# Patient Record
Sex: FEMALE | Employment: UNEMPLOYED | URBAN - METROPOLITAN AREA
[De-identification: names, ages, dates, MRNs, and addresses within clinical notes are randomized per-mention and may not be internally consistent; named-entity substitution may affect disease eponyms.]

---

## 2018-04-02 RX ORDER — ESCITALOPRAM OXALATE 20 MG/1
20 TABLET ORAL DAILY
COMMUNITY
Start: 2018-02-08

## 2018-04-02 RX ORDER — METOPROLOL SUCCINATE 50 MG/1
50 TABLET, EXTENDED RELEASE ORAL DAILY
COMMUNITY
Start: 2018-02-08

## 2018-04-03 ENCOUNTER — ANESTHESIA EVENT (OUTPATIENT)
Dept: SURGERY | Age: 58
DRG: 460 | End: 2018-04-03
Payer: COMMERCIAL

## 2018-04-04 ENCOUNTER — ANESTHESIA (OUTPATIENT)
Dept: SURGERY | Age: 58
DRG: 460 | End: 2018-04-04
Payer: COMMERCIAL

## 2018-04-04 ENCOUNTER — APPOINTMENT (OUTPATIENT)
Dept: GENERAL RADIOLOGY | Age: 58
DRG: 460 | End: 2018-04-04
Attending: ORTHOPAEDIC SURGERY
Payer: COMMERCIAL

## 2018-04-04 ENCOUNTER — HOSPITAL ENCOUNTER (INPATIENT)
Age: 58
LOS: 3 days | Discharge: HOME HEALTH | DRG: 460 | End: 2018-04-07
Attending: ORTHOPAEDIC SURGERY | Admitting: ORTHOPAEDIC SURGERY
Payer: COMMERCIAL

## 2018-04-04 PROBLEM — M48.061 LUMBAR STENOSIS: Status: ACTIVE | Noted: 2018-04-04

## 2018-04-04 LAB
ABO + RH BLD: NORMAL
BLOOD GROUP ANTIBODIES SERPL: NORMAL
SPECIMEN EXP DATE BLD: NORMAL

## 2018-04-04 PROCEDURE — 0SG1071 FUSION OF 2 OR MORE LUMBAR VERTEBRAL JOINTS WITH AUTOLOGOUS TISSUE SUBSTITUTE, POSTERIOR APPROACH, POSTERIOR COLUMN, OPEN APPROACH: ICD-10-PCS | Performed by: ORTHOPAEDIC SURGERY

## 2018-04-04 PROCEDURE — 77030004391 HC BUR FLUT MEDT -C: Performed by: ORTHOPAEDIC SURGERY

## 2018-04-04 PROCEDURE — 77030034850: Performed by: ORTHOPAEDIC SURGERY

## 2018-04-04 PROCEDURE — 72100 X-RAY EXAM L-S SPINE 2/3 VWS: CPT

## 2018-04-04 PROCEDURE — 74011250636 HC RX REV CODE- 250/636: Performed by: ORTHOPAEDIC SURGERY

## 2018-04-04 PROCEDURE — 76060000039 HC ANESTHESIA 4 TO 4.5 HR: Performed by: ORTHOPAEDIC SURGERY

## 2018-04-04 PROCEDURE — 0SG3071 FUSION OF LUMBOSACRAL JOINT WITH AUTOLOGOUS TISSUE SUBSTITUTE, POSTERIOR APPROACH, POSTERIOR COLUMN, OPEN APPROACH: ICD-10-PCS | Performed by: ORTHOPAEDIC SURGERY

## 2018-04-04 PROCEDURE — 74011000250 HC RX REV CODE- 250: Performed by: ORTHOPAEDIC SURGERY

## 2018-04-04 PROCEDURE — 77030027138 HC INCENT SPIROMETER -A

## 2018-04-04 PROCEDURE — 77030014647 HC SEAL FBRN TISSL BAXT -D: Performed by: ORTHOPAEDIC SURGERY

## 2018-04-04 PROCEDURE — 74011250636 HC RX REV CODE- 250/636: Performed by: PHYSICIAN ASSISTANT

## 2018-04-04 PROCEDURE — 77030013079 HC BLNKT BAIR HGGR 3M -A: Performed by: NURSE ANESTHETIST, CERTIFIED REGISTERED

## 2018-04-04 PROCEDURE — 77030030107 HC BLD BN MILL DISP STRY -C: Performed by: ORTHOPAEDIC SURGERY

## 2018-04-04 PROCEDURE — 86901 BLOOD TYPING SEROLOGIC RH(D): CPT

## 2018-04-04 PROCEDURE — 77030008683 HC TU ET CUF COVD -A: Performed by: ANESTHESIOLOGY

## 2018-04-04 PROCEDURE — C1713 ANCHOR/SCREW BN/BN,TIS/BN: HCPCS | Performed by: ORTHOPAEDIC SURGERY

## 2018-04-04 PROCEDURE — 74011250636 HC RX REV CODE- 250/636: Performed by: NURSE ANESTHETIST, CERTIFIED REGISTERED

## 2018-04-04 PROCEDURE — 74011250637 HC RX REV CODE- 250/637: Performed by: ORTHOPAEDIC SURGERY

## 2018-04-04 PROCEDURE — 76210000017 HC OR PH I REC 1.5 TO 2 HR: Performed by: ORTHOPAEDIC SURGERY

## 2018-04-04 PROCEDURE — 77030003028 HC SUT VCRL J&J -A: Performed by: ORTHOPAEDIC SURGERY

## 2018-04-04 PROCEDURE — 74011000258 HC RX REV CODE- 258

## 2018-04-04 PROCEDURE — 77030008477 HC STYL SATN SLP COVD -A: Performed by: ANESTHESIOLOGY

## 2018-04-04 PROCEDURE — 77030018673: Performed by: ORTHOPAEDIC SURGERY

## 2018-04-04 PROCEDURE — 77030037735: Performed by: ORTHOPAEDIC SURGERY

## 2018-04-04 PROCEDURE — 74011000250 HC RX REV CODE- 250

## 2018-04-04 PROCEDURE — 77030032490 HC SLV COMPR SCD KNE COVD -B: Performed by: ORTHOPAEDIC SURGERY

## 2018-04-04 PROCEDURE — 77030011640 HC PAD GRND REM COVD -A: Performed by: ORTHOPAEDIC SURGERY

## 2018-04-04 PROCEDURE — 74011250636 HC RX REV CODE- 250/636

## 2018-04-04 PROCEDURE — 77030019908 HC STETH ESOPH SIMS -A: Performed by: NURSE ANESTHETIST, CERTIFIED REGISTERED

## 2018-04-04 PROCEDURE — 74011250637 HC RX REV CODE- 250/637: Performed by: NURSE ANESTHETIST, CERTIFIED REGISTERED

## 2018-04-04 PROCEDURE — 76010000175 HC OR TIME 4 TO 4.5 HR INTENSV-TIER 1: Performed by: ORTHOPAEDIC SURGERY

## 2018-04-04 PROCEDURE — C1762 CONN TISS, HUMAN(INC FASCIA): HCPCS | Performed by: ORTHOPAEDIC SURGERY

## 2018-04-04 PROCEDURE — 74011000272 HC RX REV CODE- 272: Performed by: ORTHOPAEDIC SURGERY

## 2018-04-04 PROCEDURE — 77030018836 HC SOL IRR NACL ICUM -A: Performed by: ORTHOPAEDIC SURGERY

## 2018-04-04 PROCEDURE — 77030018723 HC ELCTRD BLD COVD -A: Performed by: ORTHOPAEDIC SURGERY

## 2018-04-04 PROCEDURE — 77030031139 HC SUT VCRL2 J&J -A: Performed by: ORTHOPAEDIC SURGERY

## 2018-04-04 PROCEDURE — 65270000029 HC RM PRIVATE

## 2018-04-04 PROCEDURE — 77030020274 HC MISC IMPL ORTHOPEDIC: Performed by: ORTHOPAEDIC SURGERY

## 2018-04-04 DEVICE — IMPLANTABLE DEVICE: Type: IMPLANTABLE DEVICE | Site: BACK | Status: FUNCTIONAL

## 2018-04-04 RX ORDER — DEXTROSE, SODIUM CHLORIDE, AND POTASSIUM CHLORIDE 5; .45; .15 G/100ML; G/100ML; G/100ML
100 INJECTION INTRAVENOUS CONTINUOUS
Status: DISCONTINUED | OUTPATIENT
Start: 2018-04-04 | End: 2018-04-07

## 2018-04-04 RX ORDER — ACETAMINOPHEN 325 MG/1
650 TABLET ORAL
Status: DISCONTINUED | OUTPATIENT
Start: 2018-04-04 | End: 2018-04-07 | Stop reason: HOSPADM

## 2018-04-04 RX ORDER — MORPHINE SULFATE 4 MG/ML
4 INJECTION INTRAVENOUS
Status: DISCONTINUED | OUTPATIENT
Start: 2018-04-04 | End: 2018-04-04

## 2018-04-04 RX ORDER — FAMOTIDINE 20 MG/1
20 TABLET, FILM COATED ORAL ONCE
Status: COMPLETED | OUTPATIENT
Start: 2018-04-04 | End: 2018-04-04

## 2018-04-04 RX ORDER — FENTANYL CITRATE 50 UG/ML
50 INJECTION, SOLUTION INTRAMUSCULAR; INTRAVENOUS AS NEEDED
Status: DISCONTINUED | OUTPATIENT
Start: 2018-04-04 | End: 2018-04-04 | Stop reason: HOSPADM

## 2018-04-04 RX ORDER — DEXAMETHASONE SODIUM PHOSPHATE 4 MG/ML
INJECTION, SOLUTION INTRA-ARTICULAR; INTRALESIONAL; INTRAMUSCULAR; INTRAVENOUS; SOFT TISSUE AS NEEDED
Status: DISCONTINUED | OUTPATIENT
Start: 2018-04-04 | End: 2018-04-04 | Stop reason: HOSPADM

## 2018-04-04 RX ORDER — CEFAZOLIN SODIUM 2 G/50ML
2 SOLUTION INTRAVENOUS EVERY 8 HOURS
Status: COMPLETED | OUTPATIENT
Start: 2018-04-04 | End: 2018-04-05

## 2018-04-04 RX ORDER — VECURONIUM BROMIDE FOR INJECTION 1 MG/ML
INJECTION, POWDER, LYOPHILIZED, FOR SOLUTION INTRAVENOUS AS NEEDED
Status: DISCONTINUED | OUTPATIENT
Start: 2018-04-04 | End: 2018-04-04 | Stop reason: HOSPADM

## 2018-04-04 RX ORDER — ESMOLOL HYDROCHLORIDE 10 MG/ML
INJECTION INTRAVENOUS AS NEEDED
Status: DISCONTINUED | OUTPATIENT
Start: 2018-04-04 | End: 2018-04-04 | Stop reason: HOSPADM

## 2018-04-04 RX ORDER — SODIUM CHLORIDE 0.9 % (FLUSH) 0.9 %
5-10 SYRINGE (ML) INJECTION EVERY 8 HOURS
Status: DISCONTINUED | OUTPATIENT
Start: 2018-04-04 | End: 2018-04-07 | Stop reason: HOSPADM

## 2018-04-04 RX ORDER — ONDANSETRON 2 MG/ML
4 INJECTION INTRAMUSCULAR; INTRAVENOUS ONCE
Status: DISCONTINUED | OUTPATIENT
Start: 2018-04-04 | End: 2018-04-04 | Stop reason: HOSPADM

## 2018-04-04 RX ORDER — LIDOCAINE HYDROCHLORIDE 20 MG/ML
INJECTION, SOLUTION EPIDURAL; INFILTRATION; INTRACAUDAL; PERINEURAL AS NEEDED
Status: DISCONTINUED | OUTPATIENT
Start: 2018-04-04 | End: 2018-04-04 | Stop reason: HOSPADM

## 2018-04-04 RX ORDER — ONDANSETRON 2 MG/ML
4 INJECTION INTRAMUSCULAR; INTRAVENOUS
Status: DISCONTINUED | OUTPATIENT
Start: 2018-04-04 | End: 2018-04-07 | Stop reason: HOSPADM

## 2018-04-04 RX ORDER — SODIUM CHLORIDE, SODIUM LACTATE, POTASSIUM CHLORIDE, CALCIUM CHLORIDE 600; 310; 30; 20 MG/100ML; MG/100ML; MG/100ML; MG/100ML
50 INJECTION, SOLUTION INTRAVENOUS CONTINUOUS
Status: DISCONTINUED | OUTPATIENT
Start: 2018-04-04 | End: 2018-04-04 | Stop reason: HOSPADM

## 2018-04-04 RX ORDER — NEOSTIGMINE METHYLSULFATE 5 MG/5 ML
SYRINGE (ML) INTRAVENOUS AS NEEDED
Status: DISCONTINUED | OUTPATIENT
Start: 2018-04-04 | End: 2018-04-04 | Stop reason: HOSPADM

## 2018-04-04 RX ORDER — OXYCODONE AND ACETAMINOPHEN 10; 325 MG/1; MG/1
2 TABLET ORAL
Status: DISCONTINUED | OUTPATIENT
Start: 2018-04-04 | End: 2018-04-06

## 2018-04-04 RX ORDER — DIAZEPAM 5 MG/1
10 TABLET ORAL
Status: DISCONTINUED | OUTPATIENT
Start: 2018-04-04 | End: 2018-04-06

## 2018-04-04 RX ORDER — SODIUM CHLORIDE 0.9 % (FLUSH) 0.9 %
5-10 SYRINGE (ML) INJECTION AS NEEDED
Status: DISCONTINUED | OUTPATIENT
Start: 2018-04-04 | End: 2018-04-07 | Stop reason: HOSPADM

## 2018-04-04 RX ORDER — SUCCINYLCHOLINE CHLORIDE 20 MG/ML
INJECTION INTRAMUSCULAR; INTRAVENOUS AS NEEDED
Status: DISCONTINUED | OUTPATIENT
Start: 2018-04-04 | End: 2018-04-04 | Stop reason: HOSPADM

## 2018-04-04 RX ORDER — MORPHINE SULFATE 10 MG/ML
INJECTION, SOLUTION INTRAMUSCULAR; INTRAVENOUS AS NEEDED
Status: DISCONTINUED | OUTPATIENT
Start: 2018-04-04 | End: 2018-04-04 | Stop reason: HOSPADM

## 2018-04-04 RX ORDER — MIDAZOLAM HYDROCHLORIDE 1 MG/ML
INJECTION, SOLUTION INTRAMUSCULAR; INTRAVENOUS AS NEEDED
Status: DISCONTINUED | OUTPATIENT
Start: 2018-04-04 | End: 2018-04-04 | Stop reason: HOSPADM

## 2018-04-04 RX ORDER — GLYCOPYRROLATE 0.2 MG/ML
INJECTION INTRAMUSCULAR; INTRAVENOUS AS NEEDED
Status: DISCONTINUED | OUTPATIENT
Start: 2018-04-04 | End: 2018-04-04 | Stop reason: HOSPADM

## 2018-04-04 RX ORDER — MORPHINE SULFATE 4 MG/ML
4 INJECTION, SOLUTION INTRAMUSCULAR; INTRAVENOUS
Status: DISCONTINUED | OUTPATIENT
Start: 2018-04-04 | End: 2018-04-04 | Stop reason: HOSPADM

## 2018-04-04 RX ORDER — CEFAZOLIN SODIUM 2 G/50ML
2 SOLUTION INTRAVENOUS
Status: COMPLETED | OUTPATIENT
Start: 2018-04-04 | End: 2018-04-04

## 2018-04-04 RX ORDER — FENTANYL CITRATE 50 UG/ML
INJECTION, SOLUTION INTRAMUSCULAR; INTRAVENOUS AS NEEDED
Status: DISCONTINUED | OUTPATIENT
Start: 2018-04-04 | End: 2018-04-04 | Stop reason: HOSPADM

## 2018-04-04 RX ORDER — PROPOFOL 10 MG/ML
INJECTION, EMULSION INTRAVENOUS AS NEEDED
Status: DISCONTINUED | OUTPATIENT
Start: 2018-04-04 | End: 2018-04-04 | Stop reason: HOSPADM

## 2018-04-04 RX ORDER — SODIUM CHLORIDE, SODIUM LACTATE, POTASSIUM CHLORIDE, CALCIUM CHLORIDE 600; 310; 30; 20 MG/100ML; MG/100ML; MG/100ML; MG/100ML
25 INJECTION, SOLUTION INTRAVENOUS CONTINUOUS
Status: DISCONTINUED | OUTPATIENT
Start: 2018-04-04 | End: 2018-04-07

## 2018-04-04 RX ORDER — HYDROMORPHONE HYDROCHLORIDE 1 MG/ML
2 INJECTION, SOLUTION INTRAMUSCULAR; INTRAVENOUS; SUBCUTANEOUS
Status: DISCONTINUED | OUTPATIENT
Start: 2018-04-04 | End: 2018-04-04 | Stop reason: CLARIF

## 2018-04-04 RX ORDER — MORPHINE SULFATE 4 MG/ML
4-6 INJECTION INTRAVENOUS
Status: DISCONTINUED | OUTPATIENT
Start: 2018-04-04 | End: 2018-04-06

## 2018-04-04 RX ORDER — EPHEDRINE SULFATE 50 MG/ML
INJECTION, SOLUTION INTRAVENOUS AS NEEDED
Status: DISCONTINUED | OUTPATIENT
Start: 2018-04-04 | End: 2018-04-04 | Stop reason: HOSPADM

## 2018-04-04 RX ORDER — DEXAMETHASONE SODIUM PHOSPHATE 4 MG/ML
4 INJECTION, SOLUTION INTRA-ARTICULAR; INTRALESIONAL; INTRAMUSCULAR; INTRAVENOUS; SOFT TISSUE ONCE
Status: COMPLETED | OUTPATIENT
Start: 2018-04-04 | End: 2018-04-04

## 2018-04-04 RX ORDER — SODIUM CHLORIDE 9 MG/ML
INJECTION, SOLUTION INTRAVENOUS
Status: DISCONTINUED | OUTPATIENT
Start: 2018-04-04 | End: 2018-04-04 | Stop reason: HOSPADM

## 2018-04-04 RX ORDER — VANCOMYCIN HYDROCHLORIDE 1 G/20ML
INJECTION, POWDER, LYOPHILIZED, FOR SOLUTION INTRAVENOUS AS NEEDED
Status: DISCONTINUED | OUTPATIENT
Start: 2018-04-04 | End: 2018-04-04 | Stop reason: HOSPADM

## 2018-04-04 RX ORDER — ONDANSETRON 2 MG/ML
INJECTION INTRAMUSCULAR; INTRAVENOUS AS NEEDED
Status: DISCONTINUED | OUTPATIENT
Start: 2018-04-04 | End: 2018-04-04 | Stop reason: HOSPADM

## 2018-04-04 RX ADMIN — DEXAMETHASONE SODIUM PHOSPHATE 4 MG: 4 INJECTION, SOLUTION INTRAMUSCULAR; INTRAVENOUS at 20:41

## 2018-04-04 RX ADMIN — ESMOLOL HYDROCHLORIDE 5 MG: 10 INJECTION INTRAVENOUS at 08:46

## 2018-04-04 RX ADMIN — SODIUM CHLORIDE: 9 INJECTION, SOLUTION INTRAVENOUS at 07:55

## 2018-04-04 RX ADMIN — FENTANYL CITRATE 25 MCG: 50 INJECTION, SOLUTION INTRAMUSCULAR; INTRAVENOUS at 11:12

## 2018-04-04 RX ADMIN — FENTANYL CITRATE 50 MCG: 50 INJECTION, SOLUTION INTRAMUSCULAR; INTRAVENOUS at 12:53

## 2018-04-04 RX ADMIN — Medication 10 ML: at 15:38

## 2018-04-04 RX ADMIN — MORPHINE SULFATE 2 MG: 10 INJECTION, SOLUTION INTRAMUSCULAR; INTRAVENOUS at 11:14

## 2018-04-04 RX ADMIN — SODIUM CHLORIDE, SODIUM LACTATE, POTASSIUM CHLORIDE, AND CALCIUM CHLORIDE: 600; 310; 30; 20 INJECTION, SOLUTION INTRAVENOUS at 09:20

## 2018-04-04 RX ADMIN — FAMOTIDINE 20 MG: 20 TABLET ORAL at 07:01

## 2018-04-04 RX ADMIN — CEFAZOLIN SODIUM 2 G: 2 SOLUTION INTRAVENOUS at 22:04

## 2018-04-04 RX ADMIN — MORPHINE SULFATE 4 MG: 10 INJECTION, SOLUTION INTRAMUSCULAR; INTRAVENOUS at 08:10

## 2018-04-04 RX ADMIN — LIDOCAINE HYDROCHLORIDE 50 MG: 20 INJECTION, SOLUTION EPIDURAL; INFILTRATION; INTRACAUDAL; PERINEURAL at 07:30

## 2018-04-04 RX ADMIN — VECURONIUM BROMIDE FOR INJECTION 1 MG: 1 INJECTION, POWDER, LYOPHILIZED, FOR SOLUTION INTRAVENOUS at 07:42

## 2018-04-04 RX ADMIN — FENTANYL CITRATE 50 MCG: 50 INJECTION, SOLUTION INTRAMUSCULAR; INTRAVENOUS at 12:21

## 2018-04-04 RX ADMIN — VECURONIUM BROMIDE FOR INJECTION 1 MG: 1 INJECTION, POWDER, LYOPHILIZED, FOR SOLUTION INTRAVENOUS at 09:18

## 2018-04-04 RX ADMIN — SUCCINYLCHOLINE CHLORIDE 100 MG: 20 INJECTION INTRAMUSCULAR; INTRAVENOUS at 07:30

## 2018-04-04 RX ADMIN — Medication 3 MG: at 11:13

## 2018-04-04 RX ADMIN — PROPOFOL 200 MG: 10 INJECTION, EMULSION INTRAVENOUS at 07:30

## 2018-04-04 RX ADMIN — VECURONIUM BROMIDE FOR INJECTION 5 MG: 1 INJECTION, POWDER, LYOPHILIZED, FOR SOLUTION INTRAVENOUS at 07:35

## 2018-04-04 RX ADMIN — EPHEDRINE SULFATE 5 MG: 50 INJECTION, SOLUTION INTRAVENOUS at 08:36

## 2018-04-04 RX ADMIN — OXYCODONE HYDROCHLORIDE AND ACETAMINOPHEN 2 TABLET: 10; 325 TABLET ORAL at 14:14

## 2018-04-04 RX ADMIN — MORPHINE SULFATE 6 MG: 4 INJECTION INTRAVENOUS at 20:42

## 2018-04-04 RX ADMIN — VECURONIUM BROMIDE FOR INJECTION 1 MG: 1 INJECTION, POWDER, LYOPHILIZED, FOR SOLUTION INTRAVENOUS at 10:07

## 2018-04-04 RX ADMIN — DIAZEPAM 10 MG: 5 TABLET ORAL at 16:49

## 2018-04-04 RX ADMIN — OXYCODONE HYDROCHLORIDE AND ACETAMINOPHEN 2 TABLET: 10; 325 TABLET ORAL at 23:45

## 2018-04-04 RX ADMIN — VECURONIUM BROMIDE FOR INJECTION 1 MG: 1 INJECTION, POWDER, LYOPHILIZED, FOR SOLUTION INTRAVENOUS at 09:41

## 2018-04-04 RX ADMIN — CEFAZOLIN SODIUM 2 G: 2 SOLUTION INTRAVENOUS at 14:15

## 2018-04-04 RX ADMIN — EPHEDRINE SULFATE 10 MG: 50 INJECTION, SOLUTION INTRAVENOUS at 07:45

## 2018-04-04 RX ADMIN — DEXTROSE MONOHYDRATE, SODIUM CHLORIDE, AND POTASSIUM CHLORIDE 100 ML/HR: 50; 4.5; 1.49 INJECTION, SOLUTION INTRAVENOUS at 13:59

## 2018-04-04 RX ADMIN — CEFAZOLIN SODIUM 2 G: 2 SOLUTION INTRAVENOUS at 07:40

## 2018-04-04 RX ADMIN — FENTANYL CITRATE 100 MCG: 50 INJECTION, SOLUTION INTRAMUSCULAR; INTRAVENOUS at 07:30

## 2018-04-04 RX ADMIN — VECURONIUM BROMIDE FOR INJECTION 1 MG: 1 INJECTION, POWDER, LYOPHILIZED, FOR SOLUTION INTRAVENOUS at 10:38

## 2018-04-04 RX ADMIN — FENTANYL CITRATE 25 MCG: 50 INJECTION, SOLUTION INTRAMUSCULAR; INTRAVENOUS at 11:30

## 2018-04-04 RX ADMIN — Medication 10 ML: at 23:46

## 2018-04-04 RX ADMIN — SODIUM CHLORIDE, SODIUM LACTATE, POTASSIUM CHLORIDE, AND CALCIUM CHLORIDE 50 ML/HR: 600; 310; 30; 20 INJECTION, SOLUTION INTRAVENOUS at 12:25

## 2018-04-04 RX ADMIN — MORPHINE SULFATE 4 MG: 4 INJECTION INTRAVENOUS at 17:53

## 2018-04-04 RX ADMIN — DEXAMETHASONE SODIUM PHOSPHATE 4 MG: 4 INJECTION, SOLUTION INTRA-ARTICULAR; INTRALESIONAL; INTRAMUSCULAR; INTRAVENOUS; SOFT TISSUE at 08:21

## 2018-04-04 RX ADMIN — FENTANYL CITRATE 50 MCG: 50 INJECTION, SOLUTION INTRAMUSCULAR; INTRAVENOUS at 08:26

## 2018-04-04 RX ADMIN — EPHEDRINE SULFATE 5 MG: 50 INJECTION, SOLUTION INTRAVENOUS at 11:21

## 2018-04-04 RX ADMIN — VECURONIUM BROMIDE FOR INJECTION 1 MG: 1 INJECTION, POWDER, LYOPHILIZED, FOR SOLUTION INTRAVENOUS at 08:52

## 2018-04-04 RX ADMIN — OXYCODONE HYDROCHLORIDE AND ACETAMINOPHEN 2 TABLET: 10; 325 TABLET ORAL at 19:28

## 2018-04-04 RX ADMIN — ONDANSETRON 4 MG: 2 INJECTION INTRAMUSCULAR; INTRAVENOUS at 10:54

## 2018-04-04 RX ADMIN — MIDAZOLAM HYDROCHLORIDE 2 MG: 1 INJECTION, SOLUTION INTRAMUSCULAR; INTRAVENOUS at 07:27

## 2018-04-04 RX ADMIN — GLYCOPYRROLATE 0.4 MG: 0.2 INJECTION INTRAMUSCULAR; INTRAVENOUS at 08:31

## 2018-04-04 RX ADMIN — GLYCOPYRROLATE 0.4 MG: 0.2 INJECTION INTRAMUSCULAR; INTRAVENOUS at 11:13

## 2018-04-04 RX ADMIN — SODIUM CHLORIDE, SODIUM LACTATE, POTASSIUM CHLORIDE, AND CALCIUM CHLORIDE 25 ML/HR: 600; 310; 30; 20 INJECTION, SOLUTION INTRAVENOUS at 07:01

## 2018-04-04 NOTE — OP NOTES
[facility]  OPERATIVE REPORT    Harris Mosher  MR#: [id number]  : 1960  ACCOUNT #: [de-identified]   DATE OF SERVICE: 2018    PREOPERATIVE DIAGNOSES:  Lumbar stenosis and spondylosis, L3 to S1, status post decompression, L4 to S1.    POSTOPERATIVE DIAGNOSES:  Lumbar stenosis and spondylosis, L3 to S1, status post decompression, L4 to S1.    PROCEDURES PERFORMED:  1. Lumbar decompression L3 to S1, bilateral exploration and decompression of the L3, L4, L5 and S1 nerve roots with foraminotomy and partial facetectomy. 2.  FloSpine [place] instrumentation L3 to S1.  3.  Bilateral posterolateral spinal fusion L3 to S1 with local bone graft, bone bank bone graft and autologous growth factor. SURGEON:  DIDI Felix III, MD    ASSISTANT:  Naveen Trejo. ANESTHESIA:  General.    COMPLICATIONS:       IMPLANTS:       FINDINGS:  The patient had previously undergone decompression L4 to S1. There is residual of the L4 and L5 hemilamina. There was resultant foraminal stenosis L4-L5 and L5-S1. The patient had a moderate central subarticular stenosis at L3-4. PROCEDURE:  Under general anesthesia, the patient was in the prone position on Francisco J frame and peripheral nerves padded. Back prepped and draped in a sterile fashion. Midline incision was made L3 to sacrum, muscles subperiosteally exposed the laterally transverse processes L3 to L5 and then the sacral ala. Radiograph taken for localization position. Large and previous decompression along the caudal border of the L3 lamina as well as residual hemilamina at L4 and 5 sharply delineated with a curet. The inferior portion of the L2 lamina, the L3 lamina, residual L4 lamina, residual L5 lamina, superior portion of S1 lamina removed with Leksell and Kerrison rongeurs.   Decompression line to the L3, L4, L5 and S1 pedicles and the respective L3, L4, L5 and S1 nerve roots decompressed bilaterally around the pedicles and oramen, performed a foraminotomy and partial facetectomy. At the end of decompression, a ball probe could be placed around the pedicles into the foramen without nerve root compromise. Pedicle holes made bilateral L3, L4, L5 and S1 under direct visual control. Holes probed with depth gauge and felt to be within bone. Metallic markers placed. Radiographs obtained and were satisfactory. Beginning first on the left-hand side, posterolateral elements L3 to S1 decorticated with a Midas Deepak drill and bone graft material was placed. The [place] 5.5 mm open variable-angle instrumentation pedicle screws were then placed L3, L4, L5 and S1. In similar fashion, right-hand side decorticated, bone grafted and screws placed. Medial pedicles palpated. No exposed hardware. Prone and PA and lateral radiographs obtained and were satisfactory. Rodrick placed bilaterally followed by setscrews which were tightened and torqued 120-inch pounds. Nerve roots again evaluated were satisfactory. Gelfoam placed in the exposed dura. Additional bone graft placed about the rods and then the wound was closed in layers and dressed. Patient awakened and taken to recovery room in satisfactory condition without complication. ESTIMATED BLOOD LOSS:  300 mL. FLUIDS:  The patient received 125 mL Cell Saver blood, 2400 mL of crystalloid fluid. URINE OUTPUT:  475 mL. SPECIMENS REMOVED:  None. TUBES AND DRAINS:  None. COUNTS:  Needle, sponge count correct without complication. At the time of dictation, the patient not awaken sufficiently to assess motor or sensation.       MD NADIA Gambino / RN  D: 04/04/2018 11:52     T: 04/04/2018 15:51  JOB #: [id number]  CC: Lisa Chirinos MD  CC: Kosta Torres MD

## 2018-04-04 NOTE — H&P
Day of Surgery Update:  Edison Leyden was seen and examined. History and physical has been reviewed. The patient has been examined.  There have been no significant clinical changes since the completion of the originally dated History and Physical.    Signed By: María Goode MD     April 4, 2018 7:22 AM

## 2018-04-04 NOTE — PROGRESS NOTES
conducted a pre-surgery visit with Ruby Zhong, who is a 63 y.o.,female. The  provided the following Interventions:  Initiated a relationship of care and support. Offered prayer and assurance of continued prayers on patient's behalf. Plan:  Chaplains will continue to follow and will provide pastoral care on an as needed/requested basis.  recommends bedside caregivers page  on duty if patient shows signs of acute spiritual or emotional distress.     [facility]   Spiritual Care   [phone]

## 2018-04-04 NOTE — ANESTHESIA POSTPROCEDURE EVALUATION
Post-Anesthesia Evaluation and Assessment    Patient: Tootie Negrete MRN: [id number]  SSN: [id number]    YOB: 1960  Age: 63 y.o. Sex: female       Cardiovascular Function/Vital Signs  Visit Vitals    BP 95/53 (BP 1 Location: Left arm, BP Patient Position: Supine; At rest)    Pulse 67    Temp 36.8 °C (98.2 °F)    Resp 13    Ht 5' 3\" (1.6 m)    Wt 78 kg (172 lb)    SpO2 100%    BMI 30.47 kg/m2       Patient is status post general anesthesia for Procedure(s):  decompression/fusion,polaris,posterolateral spine fusion l3-s1,possible iliac bolts. Nausea/Vomiting: None    Postoperative hydration reviewed and adequate. Pain:  Pain Scale 1: Numeric (0 - 10) (04/04/18 1253)  Pain Intensity 1: 6 (04/04/18 1253)   Managed    Neurological Status:   Neuro (WDL): Within Defined Limits (04/04/18 1237)  Neuro  LUE Motor Response: Spontaneous ; Purposeful (04/04/18 1237)  LLE Motor Response: Spontaneous ; Purposeful (04/04/18 1237)  RUE Motor Response: Purposeful;Spontaneous  (04/04/18 1237)  RLE Motor Response: Spontaneous ; Purposeful (04/04/18 1237)   At baseline    Mental Status and Level of Consciousness: Alert and oriented     Pulmonary Status:   O2 Device: Nasal cannula (04/04/18 1237)   Adequate oxygenation and airway patent    Complications related to anesthesia: None    Post-anesthesia assessment completed.  No concerns    Signed By: Estela Morales MD     April 4, 2018

## 2018-04-04 NOTE — IP AVS SNAPSHOT
[address] Patient: Avis Dunn MRN: [id number] :1960 A check jaydon indicates which time of day the medication should be taken. My Medications ASK your doctor about these medications Instructions Each Dose to Equal  
 Morning Noon Evening Bedtime  
 escitalopram oxalate 20 mg tablet Commonly known as:  [place] Rolando Your last dose was: Your next dose is: Take 20 mg by mouth daily. 20 mg  
    
   
   
   
  
 metoprolol succinate 50 mg XL tablet Commonly known as:  TOPROL-XL Your last dose was: Your next dose is: Take 50 mg by mouth daily.   
 50 mg

## 2018-04-04 NOTE — ANESTHESIA PREPROCEDURE EVALUATION
Anesthetic History   No history of anesthetic complications            Review of Systems / Medical History  Patient summary reviewed and pertinent labs reviewed    Pulmonary  Within defined limits                 Neuro/Psych   Within defined limits           Cardiovascular            Dysrhythmias : sinus tachycardia      Exercise tolerance: >4 METS  Comments: Well controlled on metoprolol   GI/Hepatic/Renal  Within defined limits              Endo/Other  Within defined limits           Other Findings   Comments: Documentation of current medication  Current medications obtained, documented and obtained? YES      Risk Factors for Postoperative nausea/vomiting:       History of postoperative nausea/vomiting? NO       Female? YES       Motion sickness? NO       Intended opioid administration for postoperative analgesia? YES      Smoking Abstinence:  Current Smoker? NO  Elective Surgery? YES  Seen preoperatively by anesthesiologist or proxy prior to day of surgery? YES  Pt abstained from smoking 24 hours prior to anesthesia?  N/A    Preventive care/screening for High Blood Pressure:  Aged 18 years and older: YES  Screened for high blood pressure: YES  Patients with high blood pressure referred to primary care provider   for BP management: YES                 Physical Exam    Airway  Mallampati: II  TM Distance: 4 - 6 cm  Neck ROM: normal range of motion   Mouth opening: Normal     Cardiovascular  Regular rate and rhythm,  S1 and S2 normal,  no murmur, click, rub, or gallop  Rhythm: regular  Rate: normal         Dental    Dentition: Lower dentition intact and Upper dentition intact     Pulmonary  Breath sounds clear to auscultation               Abdominal  GI exam deferred       Other Findings            Anesthetic Plan    ASA: 2  Anesthesia type: general          Induction: Intravenous  Anesthetic plan and risks discussed with: Patient

## 2018-04-04 NOTE — OP NOTES
BRIEF OPERATIVE NOTE    Date of Procedure: 4/4/2018     Preoperative Diagnosis: ddd/ stenosis  m51.36  m48.061    Postoperative Diagnosis: ddd/ stenosis  m51.36  m48.061      Procedure: Procedure(s):  Decompression/fusion,Flow spone [place] instrumentation/posterolateral spine fusion l3-s1    Surgeon(s) and Role:     * Kenney Claude, MD - Primary    Anesthesia: General     Findings: spondylosis l3-s1 with stenosis l3-4. m s/p decompression l4-s1 with foraminal stenosis l4-5, l5-s1     Estimated Blood Loss: 300  [id number]      [id number]        [id number]    Specimens: * No specimens in log *     Tubes/Drains: None    Needle/sponge count:  Correct    Complications: 0    Plan    Up at [facility]  Dc antonoi in am  PT ambulate with tlso  Home 2-3 dqays with tlso and percocet  rto 1 month

## 2018-04-04 NOTE — IP AVS SNAPSHOT
[address] Patient: Avis Dunn MRN: [id number] :1960 About your hospitalization You were admitted on:  2018 You last received care in the:  [address] [place] [address],[address] You were discharged on:  2018 Why you were hospitalized Your primary diagnosis was:  Not on File Your diagnoses also included:  Lumbar Stenosis Follow-up Information Follow up With Details Comments Contact Info Rayshawn Ortiz MD   400 [facility] Jan [facility] Juan [address] 
[phone] Demetrio Villanueva MD In 1 month call for follow up appointment [place] [address] [address] 2201 [facility] [id number] [phone] Discharge Orders None A check jaydon indicates which time of day the medication should be taken. My Medications ASK your doctor about these medications Instructions Each Dose to Equal  
 Morning Noon Evening Bedtime  
 escitalopram oxalate 20 mg tablet Commonly known as:  Aidee Rolando Your last dose was: Your next dose is: Take 20 mg by mouth daily. 20 mg  
    
   
   
   
  
 metoprolol succinate 50 mg XL tablet Commonly known as:  TOPROL-XL Your last dose was: Your next dose is: Take 50 mg by mouth daily. 50 mg Discharge Instructions DISCHARGE SUMMARY from Nurse PATIENT INSTRUCTIONS: 
 
 
F-face looks uneven A-arms unable to move or move unevenly S-speech slurred or non-existent T-time-call 911 as soon as signs and symptoms begin-DO NOT go Back to bed or wait to see if you get better-TIME IS BRAIN. Warning Signs of HEART ATTACK Call 911 if you have these symptoms: 
? Chest discomfort. Most heart attacks involve discomfort in the center of the chest that lasts more than a few minutes, or that goes away and comes back. It can feel like uncomfortable pressure, squeezing, fullness, or pain. ? Discomfort in other areas of the upper body. Symptoms can include pain or discomfort in one or both arms, the back, neck, jaw, or stomach. ? Shortness of breath with or without chest discomfort. ? Other signs may include breaking out in a cold sweat, nausea, or lightheadedness. Don't wait more than five minutes to call [address]! Fast action can save your life. Calling 911 is almost always the fastest way to get lifesaving treatment. Emergency Medical Services staff can begin treatment when they arrive  up to an hour sooner than if someone gets to the hospital by car. The discharge information has been reviewed with the patient. The patient verbalized understanding. Discharge medications reviewed with the patient and appropriate educational materials and side effects teaching were provided. ___________________________________________________________________________________________________________________________________ Discharge medications reviewed with the patient and appropriate educational materials and side effects teaching were provided. Patient armband removed and shredded Closet Couturehart Announcement We are excited to announce that we are making your provider's discharge notes available to you in Closet Couturehart. You will see these notes when they are completed and signed by the physician that discharged you from your recent hospital stay.   If you have any questions or concerns about any information you see in Moreboats, please call the Health Information Department where you were seen or reach out to your Primary Care Provider for more information about your plan of care. Introducing [facility]! [facility] introduces Moreboats patient portal. Now you can access parts of your medical record, email your doctor's office, and request medication refills online. 1. In your internet browser, go to [email or web address] 2. Click on the First Time User? Click Here link in the Sign In box. You will see the New Member Sign Up page. 3. Enter your Moreboats Access Code exactly as it appears below. You will not need to use this code after youve completed the sign-up process. If you do not sign up before the expiration date, you must request a new code. · Moreboats Access Code: [email or web address] Expires: 6/28/2018 11:32 AM 
 
4. Enter the last four digits of your Social Security Number (xxxx) and Date of Birth (mm/dd/yyyy) as indicated and click Submit. You will be taken to the next sign-up page. 5. Create a Moreboats ID. This will be your Moreboats login ID and cannot be changed, so think of one that is secure and easy to remember. 6. Create a Moreboats password. You can change your password at any time. 7. Enter your Password Reset Question and Answer. This can be used at a later time if you forget your password. 8. Enter your e-mail address. You will receive e-mail notification when new information is available in [address] 9. Click Sign Up. You can now view and download portions of your medical record. 10. Click the Download Summary menu link to download a portable copy of your medical information. If you have questions, please visit the Frequently Asked Questions section of the Moreboats website. Remember, Moreboats is NOT to be used for urgent needs. For medical emergencies, dial 911. Now available from your iPhone and Android! Introducing Ritchie Kapadia As a [address] [place] [address] patient, I wanted to make you aware of our electronic visit tool called Ritchie WheelerAINSTEC - Financial Reconciliation. [facility] 24/7 allows you to connect within minutes with a medical provider 24 hours a day, seven days a week via a mobile device or tablet or logging into a secure website from your computer. You can access Ritchie Wheelerfin from anywhere in the [place]. A virtual visit might be right for you when you have a simple condition and feel like you just dont want to get out of bed, or cant get away from work for an appointment, when your regular [address] [place] [address] provider is not available (evenings, weekends or holidays), or when youre out of town and need minor care. Electronic visits cost only $49 and if the [address] [place] [address] 24/7 provider determines a prescription is needed to treat your condition, one can be electronically transmitted to a nearby pharmacy*. Please take a moment to enroll today if you have not already done so. The enrollment process is free and takes just a few minutes. To enroll, please download the [employer] 24/7 lauren to your tablet or phone, or visit [email or web address] to enroll on your computer. And, as an [address] [place] [address] patient with a [employer] account, the results of your visits will be scanned into your electronic medical record and your primary care provider will be able to view the scanned results. We urge you to continue to see your regular [address] [place] [address] provider for your ongoing medical care. And while your primary care provider may not be the one available when you seek a Ritchie Sealslayafin virtual visit, the peace of mind you get from getting a real diagnosis real time can be priceless. For more information on Ritchie Arnellayafin, view our Frequently Asked Questions (FAQs) at [email or web address]. Sincerely, 
 
Dianah Sicard, MD 
Chief Medical Officer Zoya Candelaria *:  certain medications cannot be prescribed via Ritchie Kapadia Providers Seen During Your Hospitalization Provider Specialty Primary office phone Darian Padron MD Orthopedic Surgery [phone] Your Primary Care Physician (PCP) Primary Care Physician Office Phone Office Fax Zakia Howard [phone] You are allergic to the following No active allergies Recent Documentation Height Weight BMI OB Status Smoking Status 1.6 m 78 kg 30.47 kg/m2 Hysterectomy Never Smoker Emergency Contacts Name Discharge Info Relation Home Work Mobile Ceasar Hewitt DISCHARGE CAREGIVER [3] Spouse [3]   [phone] Patient Belongings The following personal items are in your possession at time of discharge: 
  Dental Appliances: None  Visual Aid: None      Home Medications: None   Jewelry: None  Clothing: Shirt, Socks, Footwear, Undergarments, Pants    Other Valuables: Eyeglasses Discharge Instructions Attachments/References LUMBAR SPINAL FUSION: POST-OP (ENGLISH) Patient Handouts Lumbar Spinal Fusion: What to Expect at Home Your Recovery After surgery, you can expect your back to feel stiff and sore. You may have trouble sitting or standing in one position for very long and may need pain medicine in the weeks after your surgery. It may take 4 to 6 weeks to get back to doing simple activities, such as light housework. It may take 6 months to a year for your back to get better completely. You may need to wear a back brace while your back heals. And your doctor may have you go to physical therapy. If your job doesn't require physical labor, you will probably be able to go back to work after 1 or 2 months. If your job involves light physical labor, it may take 3 to 6 months. Most people whose jobs involved heavy labor can never return to those jobs. This care sheet gives you a general idea about how long it will take for you to recover. But each person recovers at a different pace. Follow the steps below to get better as quickly as possible. How can you care for yourself at home? Activity ? · Rest when you feel tired. Getting enough sleep will help you recover. ? · Try to walk each day. Start by walking a little more than you did the day before. Bit by bit, increase the amount you walk. Walking boosts blood flow and helps prevent pneumonia and constipation. Walking may also decrease your muscle soreness after surgery. ? · If advised by your doctor, you may need to avoid lifting anything that would cause excessive strain on your back. This may include a child, heavy grocery bags and milk containers, a heavy briefcase or backpack, cat litter or dog food bags, or a vacuum . ? · Avoid strenuous activities, such as bicycle riding, jogging, weight lifting, or aerobic exercise, until your doctor says it is okay. ? · Do not drive for 2 to 4 weeks after your surgery or until your doctor says it is okay. ? · Avoid riding in a car for more than 30 minutes at a time for 2 to 4 weeks after surgery. If you must ride in a car for a longer distance, stop often to walk and stretch your legs. ? · Try to change your position about every 30 minutes while sitting or standing. This will help decrease your back pain while you are healing. ? · You will probably need to take at least 4 to 6 weeks off from work. It depends on the type of work you do and how you feel. ? · You may have sex as soon as you feel able, but avoid positions that put stress on your back or cause pain. Diet ? · You can eat your normal diet. If your stomach is upset, try bland, low-fat foods like plain rice, broiled chicken, toast, and yogurt. ? · Drink plenty of fluids (unless your doctor tells you not to).   
? · You may notice that your bowel movements are not regular right after your surgery. This is common. Try to avoid constipation and straining with bowel movements. You may want to take a fiber supplement every day. If you have not had a bowel movement after a couple of days, ask your doctor about taking a mild laxative. Medicines ? · Be safe with medicines. Take pain medicines exactly as directed. ¨ If the doctor gave you a prescription medicine for pain, take it as prescribed. ¨ If you are not taking a prescription pain medicine, ask your doctor if you can take an over-the-counter medicine. ? · If your doctor prescribed antibiotics, take them as directed. Do not stop taking them just because you feel better. You need to take the full course of antibiotics. ? · If you think your pain medicine is making you sick to your stomach: 
¨ Take your medicine after meals (unless your doctor has told you not to). ¨ Ask your doctor for a different pain medicine. Incision care ? · You will be given specific instructions about how to care for the cuts (incisions) the doctor made. The instructions will depend on the type of materials used to close the cut. Exercise ? · Do back exercises as instructed by your doctor. ? · Your doctor may advise you to work with a physical therapist to improve the strength and flexibility of your back. Other instructions ? · To reduce stiffness and help sore muscles, use a warm water bottle, a heating pad set on low, or a warm cloth on your back. Do not put heat right over the incision. Do not go to sleep with a heating pad on your skin. Follow-up care is a key part of your treatment and safety. Be sure to make and go to all appointments, and call your doctor if you are having problems. It's also a good idea to know your test results and keep a list of the medicines you take. When should you call for help? Call 911 anytime you think you may need emergency care. For example, call if: 
? · You passed out (lost consciousness). ? · You have sudden chest pain and shortness of breath, or you cough up blood. ? · You are unable to move a leg at all. ?Call your doctor now or seek immediate medical care if: 
? · You have pain that does not get better after you take pain pills. ? · You have new or worse symptoms in your legs or buttocks. Symptoms may include: ¨ Numbness or tingling. ¨ Weakness. ¨ Pain. ? · You lose bladder or bowel control. ? · You have loose stitches, or your incision comes open. ? · You have blood or fluid draining from the incision. ? · You have signs of infection, such as: 
¨ Increased pain, swelling, warmth, or redness. ¨ Pus draining from the incision. ¨ A fever. ? Watch closely for any changes in your health, and be sure to contact your doctor if: 
? · You do not have a bowel movement after taking a laxative. ? · You are not getting better as expected. Where can you learn more? Go to http://karli-linsey.info/. Enter I972 in the search box to learn more about \"Lumbar Spinal Fusion: What to Expect at Home. \" Current as of: March 21, 2017 Content Version: 11.4 © 0597-0426 [employer]. Care instructions adapted under license by [employer] (which disclaims liability or warranty for this information). If you have questions about a medical condition or this instruction, always ask your healthcare professional. [address] any warranty or liability for your use of this information. Please provide this summary of care documentation to your next provider. Signatures-by signing, you are acknowledging that this After Visit Summary has been reviewed with you and you have received a copy. Patient Signature:  ____________________________________________________________ Date:  ____________________________________________________________  
  
Cyndi Gomez  Provider Signature: ____________________________________________________________ Date:  ____________________________________________________________

## 2018-04-04 NOTE — PROGRESS NOTES
TRANSFER - IN REPORT:    Verbal report received from [facility] on Je Busby  being received from PACU for routine post - op      Report consisted of patients Situation, Background, Assessment and   Recommendations(SBAR). Information from the following report(s) SBAR and OR Summary was reviewed with the receiving nurse. Opportunity for questions and clarification was provided. 1330 Received pt via bed awake and alert in NAD. Rodriguez draining clear yellow urine, wearing O2 via n/c at 2L. Oriented to call bell, phone and IS with pt giving return demonstration.  at [facility].

## 2018-04-04 NOTE — PERIOP NOTES
Called out to the waiting room and spoke with the patient's  \"Hermes\" and advised him that his wife/ the patient is doing well and that the surgery is ongoing. Anabel Mclean verbalized understanding and was without questions.

## 2018-04-04 NOTE — PROGRESS NOTES
Problem: Falls - Risk of  Goal: *Absence of Falls  Document Elijah Fall Risk and appropriate interventions in the flowsheet.    Outcome: Progressing Towards Goal  Fall Risk Interventions:

## 2018-04-04 NOTE — PROGRESS NOTES
1410: PT order received and chart reviewed. Educated patient on role of PT and plan of care; verbalized understanding. Back brace not in room; spouse to bring in for mobility. Refusing mobility at this time d/t 9-10/10 pain in low back. FIDELINA Tam aware and present with meds. Will follow up.      Thank you,     Brandi Hensley, PT, DPT

## 2018-04-04 NOTE — H&P
[facility]  HISTORY AND PHYSICAL      Slime Piedra  MR#: [id number]  : 1960  ACCOUNT #: [de-identified]   ADMIT DATE: 2018    PREOPERATIVE DIAGNOSES:  Lumbar stenosis and spondylosis, L3 to S1, status post decompression, L4 to S1.    POSTOPERATIVE DIAGNOSES:  Lumbar stenosis and spondylosis, L3 to S1, status post decompression, L4 to S1.    PROCEDURES PERFORMED:  1. Lumbar decompression L3 to S1, bilateral exploration and decompression of the L3, L4, L5 and S1 nerve roots with foraminotomy and partial facetectomy. 2.  FloSpine [place]al instrumentation L3 to S1.  3.  Bilateral posterolateral spinal fusion L3 to S1 with local bone graft, bone bank bone graft and autologous growth factor. SURGEON:  DIDI Joe III, MD    ASSISTANT:  Torin Chaidez. ANESTHESIA:  General.    COMPLICATIONS:  ***     IMPLANTS:  ***     FINDINGS:  The patient had previously undergone decompression L4 to S1. There is residual of the L4 and L5 hemilamina. There was resultant foraminal stenosis L4-L5 and L5-S1. The patient had a moderate central subarticular stenosis at L3-4. PROCEDURE:  Under general anesthesia, the patient was in the prone position on Francisco J frame and peripheral nerves padded. Back prepped and draped in a sterile fashion. Midline incision was made L3 to sacrum, muscles subperiosteally exposed the laterally transverse processes L3 to L5 and then the sacral ala. Radiograph taken for localization position. Large and previous decompression along the caudal border of the L3 lamina as well as residual hemilamina at L4 and 5 sharply delineated with a curet. The inferior portion of the L2 lamina, the L3 lamina, residual L4 lamina, residual L5 lamina, superior portion of S1 lamina removed with Leksell and Kerrison rongeurs.   Decompression line to the L3, L4, L5 and S1 pedicles and the respective L3, L4, L5 and S1 nerve roots decompressed bilaterally around the pedicles and *** foramen, performed a foraminotomy and partial facetectomy. At the end of decompression, a ball probe could be placed around the pedicles into the foramen without nerve root compromise. Pedicle holes made bilateral L3, L4, L5 and S1 under direct visual control. Holes probed with depth gauge and felt to be within bone. Metallic markers placed. Radiographs obtained and were satisfactory. Beginning first on the left-hand side, posterolateral elements L3 to S1 decorticated with a Midas Deepak drill and bone graft material was placed. The Canaveral 5.5 mm open variable-angle instrumentation pedicle screws were then placed L3, L4, L5 and S1. In similar fashion, right-hand side decorticated, bone grafted and screws placed. Medial pedicles palpated. No exposed hardware. Prone and PA and lateral radiographs obtained and were satisfactory. Rodrick placed bilaterally followed by setscrews which were tightened and torqued 120-inch pounds. Nerve roots again evaluated were satisfactory. Gelfoam placed in the exposed dura. Additional bone graft placed about the rods and then the wound was closed in layers and dressed. Patient awakened and taken to recovery room in satisfactory condition without complication. ESTIMATED BLOOD LOSS:  300 mL. FLUIDS:  The patient received 125 mL Cell Saver blood, 2400 mL of crystalloid fluid. URINE OUTPUT:  475 mL. SPECIMENS REMOVED:  None. TUBES AND DRAINS:  None. COUNTS:  Needle, sponge count correct without complication. At the time of dictation, the patient not awaken sufficiently to assess motor or sensation.       MD NADIA Antunez/RN  D: 04/04/2018 11:52     T: 04/04/2018 15:51  JOB #: [id number]  CC: Tyrus Gowers MD  CC: Brenton Lopez MD

## 2018-04-04 NOTE — PERIOP NOTES
TRANSFER - OUT REPORT:    Verbal report given to FIDELINA Herrera on Ivy Harris  being transferred to 2200 for routine post - op       Report consisted of patients Situation, Background, Assessment and   Recommendations(SBAR). Information from the following report(s) SBAR, Kardex and MAR was reviewed with the receiving nurse. Lines:   Peripheral IV 04/04/18 Left Hand (Active)   Site Assessment Clean, dry, & intact 4/4/2018 12:37 PM   Phlebitis Assessment 0 4/4/2018 12:37 PM   Infiltration Assessment 0 4/4/2018 12:37 PM   Dressing Status Clean, dry, & intact 4/4/2018 12:37 PM   Dressing Type Transparent;Tape 4/4/2018 12:37 PM   Hub Color/Line Status Pink; Infusing 4/4/2018 12:37 PM   Action Taken Open ports on tubing capped 4/4/2018 12:37 PM   Alcohol Cap Used Yes 4/4/2018 12:37 PM       Peripheral IV 04/04/18 Right Hand (Active)   Site Assessment Clean, dry, & intact 4/4/2018 12:37 PM   Phlebitis Assessment 0 4/4/2018 12:37 PM   Infiltration Assessment 0 4/4/2018 12:37 PM   Dressing Status Clean, dry, & intact 4/4/2018 12:37 PM   Dressing Type Transparent;Tape 4/4/2018 12:37 PM   Hub Color/Line Status Pink; Infusing 4/4/2018 12:37 PM   Action Taken Open ports on tubing capped 4/4/2018 12:37 PM   Alcohol Cap Used Yes 4/4/2018 12:37 PM        Opportunity for questions and clarification was provided.       Patient transported with:   Registered Nurse

## 2018-04-04 NOTE — ANESTHESIA POSTPROCEDURE EVALUATION
Post-Anesthesia Evaluation and Assessment    Patient: Kole Haro MRN: [id number]  SSN: [id number]    YOB: 1960  Age: 63 y.o. Sex: female       Cardiovascular Function/Vital Signs  Visit Vitals    BP 95/53 (BP 1 Location: Left arm, BP Patient Position: Supine; At rest)    Pulse 67    Temp 36.8 °C (98.2 °F)    Resp 13    Ht 5' 3\" (1.6 m)    Wt 78 kg (172 lb)    SpO2 100%    BMI 30.47 kg/m2       Patient is status post general anesthesia for Procedure(s):  decompression/fusion,polaris,posterolateral spine fusion l3-s1,possible iliac bolts. Nausea/Vomiting: None    Postoperative hydration reviewed and adequate. Pain:  Pain Scale 1: Numeric (0 - 10) (04/04/18 1253)  Pain Intensity 1: 6 (04/04/18 1253)   Managed    Neurological Status:   Neuro (WDL): Within Defined Limits (04/04/18 1237)  Neuro  LUE Motor Response: Spontaneous ; Purposeful (04/04/18 1237)  LLE Motor Response: Spontaneous ; Purposeful (04/04/18 1237)  RUE Motor Response: Purposeful;Spontaneous  (04/04/18 1237)  RLE Motor Response: Spontaneous ; Purposeful (04/04/18 1237)   At baseline    Mental Status and Level of Consciousness: Alert and oriented     Pulmonary Status:   O2 Device: Nasal cannula (04/04/18 1237)   Adequate oxygenation and airway patent    Complications related to anesthesia: None    Post-anesthesia assessment completed.  No concerns    Signed By: Subhash Ramos MD     April 4, 2018

## 2018-04-04 NOTE — PERIOP NOTES
1143 Pt received to PACU and connected to monitor. Verbal report received from [facility]. Vital signs stable. Nurse at bedside. Will continue to monitor. 46 Called to update pt's family on current status and room assignment. 1242 Anesthesia notified about B/P and MAP. Per CRNA, bolus fluid and contact if MAP does not increase >60.     1255 Dr. Ilda Ward at bedside to assess pt status.

## 2018-04-04 NOTE — PROGRESS NOTES
1350 - introduced self to pt. Oriented pt to room. Updated white board. Admission assessment performed. 1400 - changed IVF & IVF tubing. 1415 - prn meds given for pain rated 9/10. Will cont to monitor. 1540 - pain reassessment. Pt in bed sleeping w/SO at bedside. NAD noted, will cont to monitor. 1620 - pt in bed w/SO at bedside. Pt resting comfortably. Easily arousable. NAD noted, will cont to monitor. 1750 - Nurse heard pt screaming & assessed pt pain level. Pt c/o of excruciating muscle spasms. Administered prn morphine for pain. Stayed w/pt for 15 minutes to assess the effectiveness of the morphine. Pt states that the morphine has helped subside the severity of the spasms. Left pt room at 14 [facility] - Bedside and Verbal shift change report given to Dasha Hamilton RN (oncoming nurse) by Kassy Schultz (offgoing nurse). Report included the following information SBAR, Kardex, Intake/Output and MAR.

## 2018-04-04 NOTE — PROGRESS NOTES
Pt resting in bed. Educated pt on use of IS 10 times every hour, pain control, mobility, PT. Pt verbalized understanding. Spouse at bedside. Pt has back brace in car. Spouse to bring inside. Pt complains of increasing pain. Thao Carrera RN notified.

## 2018-04-05 LAB
ANION GAP SERPL CALC-SCNC: 7 MMOL/L (ref 3–18)
BASOPHILS # BLD: 0 K/UL (ref 0–0.06)
BASOPHILS NFR BLD: 0 % (ref 0–2)
BUN SERPL-MCNC: 9 MG/DL (ref 7–18)
BUN/CREAT SERPL: 10 (ref 12–20)
CALCIUM SERPL-MCNC: 7.9 MG/DL (ref 8.5–10.1)
CHLORIDE SERPL-SCNC: 108 MMOL/L (ref 100–108)
CO2 SERPL-SCNC: 25 MMOL/L (ref 21–32)
CREAT SERPL-MCNC: 0.94 MG/DL (ref 0.6–1.3)
DIFFERENTIAL METHOD BLD: ABNORMAL
EOSINOPHIL # BLD: 0 K/UL (ref 0–0.4)
EOSINOPHIL NFR BLD: 0 % (ref 0–5)
ERYTHROCYTE [DISTWIDTH] IN BLOOD BY AUTOMATED COUNT: 14.1 % (ref 11.6–14.5)
GLUCOSE SERPL-MCNC: 112 MG/DL (ref 74–99)
HCT VFR BLD AUTO: 33.8 % (ref 35–45)
HGB BLD-MCNC: 10.6 G/DL (ref 12–16)
LYMPHOCYTES # BLD: 1.6 K/UL (ref 0.9–3.6)
LYMPHOCYTES NFR BLD: 13 % (ref 21–52)
MCH RBC QN AUTO: 30.3 PG (ref 24–34)
MCHC RBC AUTO-ENTMCNC: 31.4 G/DL (ref 31–37)
MCV RBC AUTO: 96.6 FL (ref 74–97)
MONOCYTES # BLD: 1.5 K/UL (ref 0.05–1.2)
MONOCYTES NFR BLD: 12 % (ref 3–10)
NEUTS SEG # BLD: 9.2 K/UL (ref 1.8–8)
NEUTS SEG NFR BLD: 75 % (ref 40–73)
PLATELET # BLD AUTO: 253 K/UL (ref 135–420)
PMV BLD AUTO: 10.7 FL (ref 9.2–11.8)
POTASSIUM SERPL-SCNC: 3.8 MMOL/L (ref 3.5–5.5)
RBC # BLD AUTO: 3.5 M/UL (ref 4.2–5.3)
SODIUM SERPL-SCNC: 140 MMOL/L (ref 136–145)
WBC # BLD AUTO: 12.3 K/UL (ref 4.6–13.2)

## 2018-04-05 PROCEDURE — 97530 THERAPEUTIC ACTIVITIES: CPT

## 2018-04-05 PROCEDURE — 74011250637 HC RX REV CODE- 250/637: Performed by: ORTHOPAEDIC SURGERY

## 2018-04-05 PROCEDURE — 74011250637 HC RX REV CODE- 250/637: Performed by: INTERNAL MEDICINE

## 2018-04-05 PROCEDURE — 74011250636 HC RX REV CODE- 250/636: Performed by: ORTHOPAEDIC SURGERY

## 2018-04-05 PROCEDURE — 51798 US URINE CAPACITY MEASURE: CPT

## 2018-04-05 PROCEDURE — 85025 COMPLETE CBC W/AUTO DIFF WBC: CPT | Performed by: PHYSICIAN ASSISTANT

## 2018-04-05 PROCEDURE — 80048 BASIC METABOLIC PNL TOTAL CA: CPT | Performed by: PHYSICIAN ASSISTANT

## 2018-04-05 PROCEDURE — 36415 COLL VENOUS BLD VENIPUNCTURE: CPT | Performed by: PHYSICIAN ASSISTANT

## 2018-04-05 PROCEDURE — 97162 PT EVAL MOD COMPLEX 30 MIN: CPT

## 2018-04-05 PROCEDURE — 74011250636 HC RX REV CODE- 250/636: Performed by: INTERNAL MEDICINE

## 2018-04-05 PROCEDURE — 65270000029 HC RM PRIVATE

## 2018-04-05 RX ORDER — CHLORPROMAZINE HYDROCHLORIDE 10 MG/1
10 TABLET, FILM COATED ORAL
Status: DISCONTINUED | OUTPATIENT
Start: 2018-04-05 | End: 2018-04-06

## 2018-04-05 RX ORDER — MAG HYDROX/ALUMINUM HYD/SIMETH 200-200-20
30 SUSPENSION, ORAL (FINAL DOSE FORM) ORAL
Status: DISCONTINUED | OUTPATIENT
Start: 2018-04-05 | End: 2018-04-07 | Stop reason: HOSPADM

## 2018-04-05 RX ORDER — PANTOPRAZOLE SODIUM 40 MG/1
40 TABLET, DELAYED RELEASE ORAL DAILY
Status: DISCONTINUED | OUTPATIENT
Start: 2018-04-05 | End: 2018-04-07 | Stop reason: HOSPADM

## 2018-04-05 RX ORDER — LORAZEPAM 2 MG/ML
2 INJECTION INTRAMUSCULAR ONCE
Status: COMPLETED | OUTPATIENT
Start: 2018-04-05 | End: 2018-04-05

## 2018-04-05 RX ORDER — PROCHLORPERAZINE EDISYLATE 5 MG/ML
5 INJECTION INTRAMUSCULAR; INTRAVENOUS
Status: DISCONTINUED | OUTPATIENT
Start: 2018-04-05 | End: 2018-04-05

## 2018-04-05 RX ADMIN — OXYCODONE HYDROCHLORIDE AND ACETAMINOPHEN 2 TABLET: 10; 325 TABLET ORAL at 16:32

## 2018-04-05 RX ADMIN — DIAZEPAM 10 MG: 5 TABLET ORAL at 07:52

## 2018-04-05 RX ADMIN — CHLORPROMAZINE HYDROCHLORIDE 10 MG: 10 TABLET, SUGAR COATED ORAL at 20:50

## 2018-04-05 RX ADMIN — OXYCODONE HYDROCHLORIDE AND ACETAMINOPHEN 2 TABLET: 10; 325 TABLET ORAL at 10:00

## 2018-04-05 RX ADMIN — CEFAZOLIN SODIUM 2 G: 2 SOLUTION INTRAVENOUS at 06:39

## 2018-04-05 RX ADMIN — DEXTROSE MONOHYDRATE, SODIUM CHLORIDE, AND POTASSIUM CHLORIDE 100 ML/HR: 50; 4.5; 1.49 INJECTION, SOLUTION INTRAVENOUS at 21:16

## 2018-04-05 RX ADMIN — OXYCODONE HYDROCHLORIDE AND ACETAMINOPHEN 2 TABLET: 10; 325 TABLET ORAL at 03:48

## 2018-04-05 RX ADMIN — PANTOPRAZOLE SODIUM 40 MG: 40 TABLET, DELAYED RELEASE ORAL at 23:23

## 2018-04-05 RX ADMIN — OXYCODONE HYDROCHLORIDE AND ACETAMINOPHEN 2 TABLET: 10; 325 TABLET ORAL at 22:13

## 2018-04-05 RX ADMIN — ACETAMINOPHEN 650 MG: 325 TABLET, FILM COATED ORAL at 06:44

## 2018-04-05 RX ADMIN — DIAZEPAM 10 MG: 5 TABLET ORAL at 17:50

## 2018-04-05 RX ADMIN — LORAZEPAM 2 MG: 2 INJECTION INTRAMUSCULAR; INTRAVENOUS at 18:50

## 2018-04-05 RX ADMIN — DIAZEPAM 10 MG: 5 TABLET ORAL at 23:27

## 2018-04-05 RX ADMIN — DEXTROSE MONOHYDRATE, SODIUM CHLORIDE, AND POTASSIUM CHLORIDE 100 ML/HR: 50; 4.5; 1.49 INJECTION, SOLUTION INTRAVENOUS at 12:02

## 2018-04-05 RX ADMIN — Medication 10 ML: at 07:54

## 2018-04-05 RX ADMIN — DIAZEPAM 10 MG: 5 TABLET ORAL at 01:30

## 2018-04-05 NOTE — PROGRESS NOTES
Care Management Interventions  PCP Verified by CM: Yes  Palliative Care Criteria Met (RRAT>21 & CHF Dx)?: No  Mode of Transport at Discharge: Self  Transition of Care Consult (CM Consult): Discharge Planning  Physical Therapy Consult: Yes  Current Support Network: Lives with Spouse  Confirm Follow Up Transport: Family  Plan discussed with Pt/Family/Caregiver: Yes  Discharge Location  Discharge Placement: Home     Spoke with patient in room, she stated that she lives with her  in [address] [place] [address]. She verified her demographics, insurance and PCP as correct on the facesheet. She verified her demographics, insurance and PCP as correct on the facesheet. Patient has designated __________spouse______________ to participate in his/her discharge plan and to receive any needed information. Evita Shabbir [phone]. sHe is independent with ADL's and has no DME's at this time. sHe plans to return home upon discharge and transportation will be provided by family    Reason for Admission:  Status post LAMINEC/FACETECT/FORAMIN,LUMBAR [20270] (SPINE LUMBAR POSTERIOR INTERBODY FUSION (PLIF))      RRAT Score:     10   Plan for utilizing home health:  She stated that she has a supportive family and that her mother  will help her as needed. Not interested in [facility] at this time.        Likelihood of Readmission:    low

## 2018-04-05 NOTE — PROGRESS NOTES
1933 Received patient from [address]. Patient is alert and oriented x 4.    2200 Patient remains in discomfort, although day nurse helped out a lot by getting an order for IV steroid. 0130 Patient complained of increased tension of muscle spasm, Valium given to decrease discomfort. 4037 Patient asked to ambulate per order, Insisted she was afraid of muscle spasm return, refused ambulation. 4490 Patient complained of increased tension of muscle spasm, Valium given to decrease discomfort. 0740 Bedside and Verbal shift change report given to All Lacy RN (oncoming nurse) by Jeane Up RN (offgoing nurse). Report included the following information SBAR, Kardex, Procedure Summary, Intake/Output, MAR and Recent Results.

## 2018-04-05 NOTE — PROGRESS NOTES
Progress Note      Post Operative Day: 1    Assessment:    1. Status post LAMINEC/FACETECT/FORAMIN,LUMBAR [35117] (SPINE LUMBAR POSTERIOR INTERBODY FUSION (PLIF))  LAMINEC/FACETECT/FORAMIN,EACH ADDNL [26039]  MD ARTHRODESIS POSTERIOR/POSTEROLATERAL LUMBAR [07966]  SPINE FUSN,POST TECH,EA ADDNL SGMT [81662]  INSERT VERT FIX DEV,POST,3-6 SGMTS [86505]  INSERT PELVIC FIXATION DEVICE [25141]  SPIN BONE AUTOGRFT LOCAL [69416] for ddd/ stenosis  m51.36  m48.061  Lumbar stenosis 4/4/2018,  Progressing. PLAN:    1. Mobilize. Continue P.T.  2. WBAT with TLSO  3. frequent mobilization, TEDs and SCD for DVT prophylaxis  4. Discharge Planning. HPI: Bryant Mcdonald is a 63 y.o. female patient without new complaints status post procedure for ddd/ stenosis  m51.36  m48.061  Lumbar stenosis 4/4/2018. No new orthopaedic changes. Blood pressure 93/60, pulse 67, temperature 98.1 °F (36.7 °C), resp. rate 18, height 5' 3\" (1.6 m), weight 78 kg (172 lb), SpO2 97 %. Physical Assessment:  General: in no apparent distress, well developed and well nourished, non-toxic, in no respiratory distress and acyanotic and alert   Wound: no drainage   Extremities:  Denies headache    Motor: LE strength 5/5 throughout bilaterally. Muscle tone and bulk normal.    L hip F/E 5/5 knee F/E 5/5 ankle F/E 5/5, EHL 5/5  R hip F/E 5/5 knee F/E 5/5 ankle F/E 5/5, EHL 5/5    Sensory:    Light Touch : BLE intact and equal bilaterally      Patient denies saddle anesthesia     Dressing:  CDI   DVT Exam:   No exam evidence to suggest DVT. Compartments soft and NT.           Radiology: no new ortho studies          - Lizzie Padgett PA-C  4/5/2018    Office [phone]

## 2018-04-05 NOTE — PROGRESS NOTES
1300:  Pt refusing 2nd PT session at this time due to staying in the chair for too long and needs to rest now. Will follow up at as schedule permits.

## 2018-04-05 NOTE — PROGRESS NOTES
Problem: Mobility Impaired (Adult and Pediatric)  Goal: *Acute Goals and Plan of Care (Insert Text)  Physical Therapy Goals  Initiated 4/5/2018 and to be accomplished within 7 day(s)  1. Patient will move from supine to sit and sit to supine  in bed with modified independence. 2.  Patient will perform sit to stand with modified independence. 3.  Patient will transfer from bed to chair and chair to bed with modified independence using the least restrictive device. 4.  Patient will ambulate with modified independence for 250 feet with the least restrictive device. 5.  Patient will ascend/descend 3 stairs with handrail(s) with supervision/set-up. 6.  Patient will verbalize 3/3 lumbar precautions. 7.  Patient will demonstrate compliance with lumbar precautions greater than 90% of session. 8. Patient will demonstrate appropriate use of incentive spirometer to aid in pulomnary compliance for mobility. Outcome: Progressing Towards Goal  PHYSICAL THERAPY: Initial Assessment   INPATIENT: [employer]: Hospital Day: 2     Patient: Sanjuana Norman (42 y.o. female)    Date: 4/5/2018  Primary Diagnosis: ddd/ stenosis  m51.36  m48.061  Lumbar stenosis   Procedure(s) (LRB):  decompression/fusion,polaris,posterolateral spine fusion l3-s1,possible iliac bolts (N/A), 1 Day Post-Op   Precautions: Falls and Spinal  PLOF: Independent  ASSESSMENT :  Patient requires between moderate assistance  and supervision/set-up for bed mobility, transfers and ambulation. Educated on lumbar precautions. Bill for rolling; Mod A for supine to sit. Seated EOB with supervision for balance. Donned brace with min A. Educated on breathing techniques for pain modification with mobility. Remained seated EOB for 5 minutes. Min A for sit to stand. Amb 4ft from bed to chair with min A and rolling walker. Seated in chair with min A for stand to sit; additional time. Call bell in lap.  Educated on need for RN assistance with mobility, role of PT, and plan of care. FIDELINA Darling aware. Vitals in chair 93/60, O2 saturation 97% on 2L, HR 67.  Patient presents with deficits in:  Bed Mobility, Transfers, Gait, Strength, Balance, Stairs and Precautions    Patient will benefit from skilled intervention to address the above impairments. Patients rehabilitation potential is considered to be Good  Factors which may influence rehabilitation potential include:   []         None noted  []         Mental ability/status  [x]         Medical condition  []         Home/family situation and support systems  []         Safety awareness  [x]         Pain tolerance/management  []         Other:      PLAN :  Recommendations and Planned Interventions:  [x]           Bed Mobility Training             [x]    Neuromuscular Re-Education  [x]           Transfer Training                   []    Orthotic/Prosthetic Training  [x]           Gait Training                          []    Modalities  [x]           Therapeutic Exercises          []    Edema Management/Control  [x]           Therapeutic Activities            [x]    Patient and Family Training/Education  []           Other (comment):    Frequency/Duration: Patient will be followed by physical therapy 1-2 times per day/4-7 days per week to address goals. Discharge Recommendations: None  Further Equipment Recommendations for Discharge: rolling walker     SUBJECTIVE:   Patient stated I just got valium.     OBJECTIVE DATA SUMMARY:     Past Medical History:   Diagnosis Date    Adverse effect of anesthesia     Pt.  awakens screaming and flailing    Paroxysmal atrioventricular tachycardia (HCC)      Past Surgical History:   Procedure Laterality Date    HX HYSTERECTOMY      HX LUMBAR DISKECTOMY      HX LUMBAR DISKECTOMY      HX ORTHOPAEDIC      Ulnar nrve revision    HX ORTHOPAEDIC      Brachial nerve revision    HX ORTHOPAEDIC Right     trigger finer release     Barriers to Learning/Limitations: None  Compensate with: visual, verbal, tactile, kinesthetic cues/model    G CODES:Mobility [id number] Current  CL= 60-79%   Goal  CI= 1-19%. The severity rating is based on the Other [facility] Balance Scale 2/5    Eval Complexity: History: MEDIUM  Complexity : 1-2 comorbidities / personal factors will impact the outcome/ POC Exam:MEDIUM Complexity : 3 Standardized tests and measures addressing body structure, function, activity limitation and / or participation in recreation  Presentation: MEDIUM Complexity : Evolving with changing characteristics  Clinical Decision Making:Medium Complexity [facility] Standing Balance Scale 2/5 Overall Complexity:MEDIUM    [employer] Balance Scale 2/5  0: Pt performs 25% or less of standing activity (Max assist) CN, 100% impaired. 1: Pt supports self with upper extremities but requires therapist assistance. Pt performs 25-50% of effort (Mod assist) CM, 80% to <100% impaired. 1+: Pt supports self with upper extremities but requires therapist assistance. Pt performs >50% effort. (Min assist). CL, 60% to <80% impaired. 2: Pt supports self independently with both upper extremities (walker, crutches, parallel bars). CL, 60% to <80% impaired. 2+: Pt support self independently with 1 upper extremity (cane, crutch, 1 parallel bar). CK, 40% to <60% impaired. 3: Pt stands without upper extremity support for up to 30 seconds. CK, 40% to <60% impaired. 3+: Pt stands without upper extremity support for 30 seconds or greater. CJ, 20% to <40% impaired. 4: Pt independently moves and returns center of gravity 1-2 inches in one plane. CJ, 20% to <40% impaired. 4+: Pt independently moves and returns center of gravity 1-2 inches in multiple planes. CI, 1% to <20% impaired. 5: Pt independently moves and returns center of gravity in all planes greater than 2 inches. CH, 0% impaired.     Prior Level of Function/Home Situation: Independent  Home Situation  Home Environment: Private residence  # Steps to Enter: 3  Rails to [place]: Yes  One/Two Story Residence: Two story (1st floor set-up possible)  Living Alone: No  Support Systems: Spouse/Significant Other/Partner  Patient Expects to be Discharged to[de-identified] Private residence  Current DME Used/Available at Home: None  Critical Behavior:  Neurologic State: Alert  Orientation Level: Oriented X4  Cognition: Follows commands  Safety/Judgement: Fall prevention  Psychosocial  Patient Behaviors: Cooperative    Manual Muscle Testing (LE)         R     L    Hip Flexion:   4+/5  4+/5  Knee EXT:   4+/5  4+/5  Knee FLEX:   4+/5  4+/5  Ankle DF:   4+/5  4+/5  _________________________________________________   Tone : BLE normal  Sensation: BLE intact to light touch  Range Of Motion: BLE AROM WFL    Functional Mobility:      Functional Status      Indep   (I)   Mod I   Super-vision   Min A   Mod A   Max A   Total A   Assist x2 Verbal cues Additional time Not tested   Comments   Rolling []  []  [] [x]    []    []  []  [] [] [] []    Supine to sit []  []  [] []  [x]  []  []  [] [] [] []    Sit to supine []  []  [] []  []  []  []  [] [] [] [x] Seated in chair   Sit to stand []  []  [] [x]  []  []  []  [] [] [] []    Stand to sit []  []  [] [x]  []  []  []  [] [] [] []    Bed to chair transfers []  []  [] []  []  []  []  [] [] [] [x]        Balance    Good   Fair   Poor   Unable   Not tested   Comments   Sitting static [x]  []  []  []  []    Sitting dynamic [x]  []  []  []  []    Standing static []  [x]  []  []  []    Standing dynamic []  [x]  []  []  []        Mobility/Gait:   Level of Assistance: Minimum assistance  Assistive Device: rolling walker  Distance Ambulated: 5 feet       Therapeutic Exercises:   Sit to stand  Sitting EOB 5 minutes    Pain:  Pre treatment pain level: 2   Post treatment pain level: 2  Pain Scale 1: Numeric (0 - 10)  Pain Intensity 1: 2  Pain Location 1: Back  Pain Orientation 1: Posterior  Pain Description 1: Aching  Pain Intervention(s) 1: Medication (see MAR)    Activity Tolerance:   Fair  Please refer to the flowsheet for vital signs taken during this treatment. After treatment:   [x]         Patient left in no apparent distress sitting up in chair  []         Patient left in no apparent distress in bed  [x]         Call bell left within reach  [x]         Nursing notified  []         Caregiver present  []         Bed alarm activated    COMMUNICATION/EDUCATION:   [x]         Fall prevention education was provided and the patient/caregiver indicated understanding. [x]         Patient/family have participated as able in goal setting and plan of care. [x]         Patient/family agree to work toward stated goals and plan of care. []         Patient understands intent and goals of therapy, but is neutral about his/her participation. []         Patient is unable to participate in goal setting and plan of care. Patient educated on the role of physical therapy during the acute stay  and the importance of mobility. IZZY.       Thank you for this referral.  Negar Borrego, PT   Time Calculation: 24 mins

## 2018-04-05 NOTE — PROGRESS NOTES
1730 patient yelling out still having back spasm   in to see patient, ordered ativan iv  1855 ativan 2 mg for spasm  1900 patient sleeping at present  1900 bedside shift report given to TIO Nicole  With sbar

## 2018-04-05 NOTE — PROGRESS NOTES
HPI  The patient underwent uneventful lumbar spine decompression and fusion for history of lumbar spine stenosis and spondylosis . She has  prior history of decompression surgery L4-S1  The patient is presently in severe distress, yelling and screaming complaining of  muscle spasm  worsened by severe hiccups. ACTIVE MEDICAL PROBLEMS  Paroxysmal atrial tachycardia   Palpitations  Premature ventricular contractions   GERD  Dpression     Past Medical History:   Diagnosis Date    Adverse effect of anesthesia     Pt. awakens screaming and flailing    Paroxysmal atrioventricular tachycardia (HCC)      Echocardiogram 01/17/2017:  NORMAL GLOBAL LEFT VENTRICULAR SYSTOLIC FUNCTION WITH AN ESTIMATED EJECTION FRACTION OF  65%. NORMAL DIASTOLIC FUNCTION. NORMAL CHAMBER SIZES. MILD DIFFUSE THICKENING (SCLEROSIS) OF THE AORTIC VALVE CUSPS WITHOUT REDUCED EXCURSION. NO HEMODYNAMICALLY SIGNIFICANT VALVULAR PATHOLOGY. NORMAL PULMONARY ARTERY PRESSURE OF 26 MMHG. NO PREVIOUS REPORT FOR COMPARISON. Past Surgical History:   Procedure Laterality Date    HX HYSTERECTOMY      HX LUMBAR DISKECTOMY      HX LUMBAR DISKECTOMY      HX ORTHOPAEDIC      Ulnar nrve revision    HX ORTHOPAEDIC      Brachial nerve revision    HX ORTHOPAEDIC Right     trigger finer release       Prescriptions Prior to Admission   Medication Sig    metoprolol succinate (TOPROL-XL) 50 mg XL tablet Take 50 mg by mouth daily.  escitalopram oxalate (LEXAPRO) 20 mg tablet Take 20 mg by mouth daily. Nexium OTC one capsule PO daily     ALLERGIES: NKA    History reviewed. No pertinent family history. Social History     Social History    Marital status:      Spouse name: N/A    Number of children: N/A    Years of education: N/A     Occupational History    Not on file.      Social History Main Topics    Smoking status: Never Smoker    Smokeless tobacco: Never Used    Alcohol use No    Drug use: No    Sexual activity: Not on file Other Topics Concern    Not on file     Social History Narrative        ROS  Constitutional: No fever. No chills . No sweats. No headache  Eyes: No redness. No pain. Ears: No ache. No hearing loss. Nose: No congestion. No bleed  Neck: No pain. No masses . No lumps. Cardiac: No CP. TABOR. No orthopnea. No PND. No leg edema . No palpitation  Respiratory: No cough . No SOB. No wheezing  GI:+ hiccups. No nausea . No vomiting. No reflux. No abdominal pain. Normal BM . No black stool. No blood in the stool. : antonio out; voiding well. No dysuria or hematuria  M/S: + back pain and muscle spasms  Neuro: No weakness. No numbness. Skin: No rash. No itching  HEM/LYMPH: No enlarged LN. No bruising. Endocrine: No polydipsia. No polyuria. No change in tone of voice. No cold intolerance  Psych:  No anxiety . + depression. No sleep difficulty    PHYSICAL EXAMINATION  GENERAL: in acute distress due to back pain,muscle spasm and hiccups  HEENT:    Face:Symmetrical.    CONJUNCTIVA: pink    SCLERA: Anicteric    NASAL MUCOSA: No Swelling.  No Discharge.  No bleeding  ORAL MUCOSA: Moist. No lesions  TONGUE: Moist; No lesions  TEETH:  No broken or loose tooth    GUMS: No bleeding or swelling  HARD SOFT/ PALATE/TONSILS: No Swelling. No ulcers. No Masses  OROPHARYNX: WNL  NECK: No JVD. No masses. No enlarged-tender lymph nodes.  Trachea in mid line.  Thyroid size normal. No thyroid nodules    CAROTID ARTERIES: Pulsation 2+ bilaterally. No bruits  LUNGS: CTA. BS Normal Bilaterally  HEART: RRR   Normal S1 S2. No Significant murmur. No S3 S4  ABDOMEN: Soft. Normal BS. No tenderness. No HSM /SMG. No palpable enlarged aorta. No bruits. LE: No edema. SCD in place  PULSES: Radial 2+; Femoral 2+; PT 2+    SKIN: No Rash. No Bruising. MUSCULOSKELETAL:      Cervical Spine:  Range of Motion: WNL    NEUROLOGIC:     Oriented X 4  Muscle Strength, Bulk & Tone  RUE: strength, bulk & tone: WNL  LUE: strength, bulk & tone:  WNL  RLE: strength, bulk & tone: WNL  LLE: strength, bulk & tone:  WNL     Reflexes: not examined  RUE: biceps 2+; triceps 2+; brachioradialis 2+    LUE: biceps 2+; triceps 2+; brachioradialis 2+                    RLE:patellar 2+; ankle 2 +; Babinski: plantarflexion  LLE: patellar 2+; ankle 2+; Babinski: plantarflexion     PSYCHIATRIC            Not Depressed  Anxious                                          Recent Results (from the past 24 hour(s))   TYPE & SCREEN    Collection Time: 04/04/18  6:55 AM   Result Value Ref Range    Crossmatch Expiration 04/07/2018     ABO/Rh(D) A POSITIVE     Antibody screen NEG      ASSESSMENT  Pose lumbar spine decompression infusion  Severe hiccups  Muscle spasm  PAT  GERD  Depression    PLAN  IV lorazepam 2 mg now  Thorazine 10 mg PO Q 6 hours PRN  Continue Valium as prescribed  PPI  No Lexapro while  takingThorazine due potential for prolonged QT interval and malignant arrhythmias      Santosh Barnard MD   04/05/18,11:30 PM

## 2018-04-06 LAB
ANION GAP SERPL CALC-SCNC: 7 MMOL/L (ref 3–18)
ATRIAL RATE: 118 BPM
BASOPHILS # BLD: 0 K/UL (ref 0–0.06)
BASOPHILS NFR BLD: 0 % (ref 0–2)
BUN SERPL-MCNC: 7 MG/DL (ref 7–18)
BUN/CREAT SERPL: 10 (ref 12–20)
CALCIUM SERPL-MCNC: 7.8 MG/DL (ref 8.5–10.1)
CALCULATED P AXIS, ECG09: 25 DEGREES
CALCULATED R AXIS, ECG10: 5 DEGREES
CALCULATED T AXIS, ECG11: 50 DEGREES
CHLORIDE SERPL-SCNC: 109 MMOL/L (ref 100–108)
CK MB CFR SERPL CALC: 1 % (ref 0–4)
CK MB SERPL-MCNC: 13 NG/ML (ref 5–25)
CK SERPL-CCNC: 1303 U/L (ref 26–192)
CO2 SERPL-SCNC: 27 MMOL/L (ref 21–32)
CREAT SERPL-MCNC: 0.68 MG/DL (ref 0.6–1.3)
DIAGNOSIS, 93000: NORMAL
DIFFERENTIAL METHOD BLD: ABNORMAL
EOSINOPHIL # BLD: 0 K/UL (ref 0–0.4)
EOSINOPHIL NFR BLD: 0 % (ref 0–5)
ERYTHROCYTE [DISTWIDTH] IN BLOOD BY AUTOMATED COUNT: 14.1 % (ref 11.6–14.5)
GLUCOSE SERPL-MCNC: 124 MG/DL (ref 74–99)
HCT VFR BLD AUTO: 29.6 % (ref 35–45)
HGB BLD-MCNC: 9.5 G/DL (ref 12–16)
LYMPHOCYTES # BLD: 2.4 K/UL (ref 0.9–3.6)
LYMPHOCYTES NFR BLD: 18 % (ref 21–52)
MCH RBC QN AUTO: 30.6 PG (ref 24–34)
MCHC RBC AUTO-ENTMCNC: 32.1 G/DL (ref 31–37)
MCV RBC AUTO: 95.5 FL (ref 74–97)
MONOCYTES # BLD: 1.5 K/UL (ref 0.05–1.2)
MONOCYTES NFR BLD: 11 % (ref 3–10)
NEUTS SEG # BLD: 9.7 K/UL (ref 1.8–8)
NEUTS SEG NFR BLD: 71 % (ref 40–73)
P-R INTERVAL, ECG05: 188 MS
PLATELET # BLD AUTO: 204 K/UL (ref 135–420)
PMV BLD AUTO: 10 FL (ref 9.2–11.8)
POTASSIUM SERPL-SCNC: 3.7 MMOL/L (ref 3.5–5.5)
Q-T INTERVAL, ECG07: 220 MS
QRS DURATION, ECG06: 76 MS
QTC CALCULATION (BEZET), ECG08: 308 MS
RBC # BLD AUTO: 3.1 M/UL (ref 4.2–5.3)
SODIUM SERPL-SCNC: 143 MMOL/L (ref 136–145)
TROPONIN I SERPL-MCNC: <0.02 NG/ML (ref 0–0.04)
VENTRICULAR RATE, ECG03: 118 BPM
WBC # BLD AUTO: 13.7 K/UL (ref 4.6–13.2)

## 2018-04-06 PROCEDURE — 74011250637 HC RX REV CODE- 250/637: Performed by: INTERNAL MEDICINE

## 2018-04-06 PROCEDURE — 93005 ELECTROCARDIOGRAM TRACING: CPT

## 2018-04-06 PROCEDURE — 85025 COMPLETE CBC W/AUTO DIFF WBC: CPT | Performed by: HOSPITALIST

## 2018-04-06 PROCEDURE — 74011250636 HC RX REV CODE- 250/636

## 2018-04-06 PROCEDURE — 65270000029 HC RM PRIVATE

## 2018-04-06 PROCEDURE — 74011250636 HC RX REV CODE- 250/636: Performed by: ORTHOPAEDIC SURGERY

## 2018-04-06 PROCEDURE — 82553 CREATINE MB FRACTION: CPT | Performed by: HOSPITALIST

## 2018-04-06 PROCEDURE — 80048 BASIC METABOLIC PNL TOTAL CA: CPT | Performed by: HOSPITALIST

## 2018-04-06 PROCEDURE — 97530 THERAPEUTIC ACTIVITIES: CPT

## 2018-04-06 PROCEDURE — 77030011943

## 2018-04-06 PROCEDURE — 51798 US URINE CAPACITY MEASURE: CPT

## 2018-04-06 PROCEDURE — 74011250637 HC RX REV CODE- 250/637: Performed by: ORTHOPAEDIC SURGERY

## 2018-04-06 PROCEDURE — 36415 COLL VENOUS BLD VENIPUNCTURE: CPT | Performed by: HOSPITALIST

## 2018-04-06 RX ORDER — MORPHINE SULFATE 4 MG/ML
INJECTION, SOLUTION INTRAMUSCULAR; INTRAVENOUS
Status: DISPENSED
Start: 2018-04-06 | End: 2018-04-06

## 2018-04-06 RX ORDER — OXYCODONE AND ACETAMINOPHEN 10; 325 MG/1; MG/1
1 TABLET ORAL
Status: DISCONTINUED | OUTPATIENT
Start: 2018-04-06 | End: 2018-04-07 | Stop reason: HOSPADM

## 2018-04-06 RX ORDER — METOPROLOL TARTRATE 25 MG/1
25 TABLET, FILM COATED ORAL 2 TIMES DAILY
Status: DISCONTINUED | OUTPATIENT
Start: 2018-04-06 | End: 2018-04-06

## 2018-04-06 RX ORDER — ACETAMINOPHEN 500 MG
1000 TABLET ORAL 3 TIMES DAILY
Status: DISCONTINUED | OUTPATIENT
Start: 2018-04-06 | End: 2018-04-07 | Stop reason: HOSPADM

## 2018-04-06 RX ORDER — METOPROLOL TARTRATE 25 MG/1
25 TABLET, FILM COATED ORAL 2 TIMES DAILY
Status: DISCONTINUED | OUTPATIENT
Start: 2018-04-06 | End: 2018-04-07 | Stop reason: HOSPADM

## 2018-04-06 RX ADMIN — ONDANSETRON 4 MG: 2 INJECTION INTRAMUSCULAR; INTRAVENOUS at 18:44

## 2018-04-06 RX ADMIN — MORPHINE SULFATE 4 MG: 4 INJECTION INTRAVENOUS at 01:03

## 2018-04-06 RX ADMIN — METOPROLOL TARTRATE 25 MG: 25 TABLET ORAL at 16:51

## 2018-04-06 RX ADMIN — DEXTROSE MONOHYDRATE, SODIUM CHLORIDE, AND POTASSIUM CHLORIDE 100 ML/HR: 50; 4.5; 1.49 INJECTION, SOLUTION INTRAVENOUS at 05:46

## 2018-04-06 RX ADMIN — ALUMINUM HYDROXIDE, MAGNESIUM HYDROXIDE, AND SIMETHICONE 30 ML: 200; 200; 20 SUSPENSION ORAL at 00:36

## 2018-04-06 RX ADMIN — Medication 10 ML: at 05:46

## 2018-04-06 RX ADMIN — METOPROLOL TARTRATE 25 MG: 25 TABLET ORAL at 04:31

## 2018-04-06 RX ADMIN — DEXTROSE MONOHYDRATE, SODIUM CHLORIDE, AND POTASSIUM CHLORIDE 100 ML/HR: 50; 4.5; 1.49 INJECTION, SOLUTION INTRAVENOUS at 16:51

## 2018-04-06 RX ADMIN — ACETAMINOPHEN 650 MG: 325 TABLET, FILM COATED ORAL at 01:19

## 2018-04-06 RX ADMIN — CHLORPROMAZINE HYDROCHLORIDE 10 MG: 10 TABLET, SUGAR COATED ORAL at 04:35

## 2018-04-06 RX ADMIN — ACETAMINOPHEN 650 MG: 325 TABLET, FILM COATED ORAL at 12:31

## 2018-04-06 RX ADMIN — OXYCODONE HYDROCHLORIDE AND ACETAMINOPHEN 2 TABLET: 10; 325 TABLET ORAL at 03:52

## 2018-04-06 RX ADMIN — ACETAMINOPHEN 650 MG: 325 TABLET, FILM COATED ORAL at 16:52

## 2018-04-06 RX ADMIN — ACETAMINOPHEN 650 MG: 325 TABLET, FILM COATED ORAL at 20:55

## 2018-04-06 NOTE — DISCHARGE SUMMARY
Discharge Summary    Admit Date: 2018  Discharge Date:  18    Patient ID:   Name:  Ash Espinoza      Age:  63 y.o.    :  1960    Admitting Diagnosis: ddd/ stenosis  m51.36  m48.061  Lumbar stenosis     Post Operative Day: 3    Operative Procedures:  LAMINEC/FACETECT/FORAMIN,LUMBAR [58866] (SPINE LUMBAR POSTERIOR INTERBODY FUSION (PLIF))  LAMINEC/FACETECT/FORAMIN,EACH ADDNL [41685]  DC ARTHRODESIS POSTERIOR/POSTEROLATERAL LUMBAR [88347]  SPINE FUSN,POST TECH,EA ADDNL SGMT [52086]  INSERT VERT FIX DEV,POST,3-6 SGMTS [24023]  INSERT PELVIC FIXATION DEVICE [44315]  SPIN BONE AUTOGRFT LOCAL [66426]      Isolation Precautions:   Not required. Patient is not currently contagious. Physical Exam on Discharge:  Visit Vitals    /67 (BP 1 Location: Right arm)    Pulse (!) 118    Temp 99.3 °F (37.4 °C)    Resp 16    Ht 5' 3\" (1.6 m)    Wt 78 kg (172 lb)    SpO2 92%    BMI 30.47 kg/m2         General: in no apparent distress   Extremities:  Neurovascular intact    Dressing:  Dry   DVT Exam:   No evidence of DVT seen on physical exam;  compartments soft and NT.          Relevant labs within last 72 hours:    CBC w/Diff    Lab Results   Component Value Date/Time    WBC 13.7 (H) 2018 01:00 AM    RBC 3.10 (L) 2018 01:00 AM    HCT 29.6 (L) 2018 01:00 AM    MCV 95.5 2018 01:00 AM    MCH 30.6 2018 01:00 AM    MCHC 32.1 2018 01:00 AM    RDW 14.1 2018 01:00 AM    Lab Results   Component Value Date/Time    MONOS 11 (H) 2018 01:00 AM    EOS 0 2018 01:00 AM    BASOS 0 2018 01:00 AM    RDW 14.1 2018 01:00 AM          BMP   Lab Results   Component Value Date     2018    CO2 27 2018    BUN 7 2018          Coagulation   No results found for: INR, APTT           Condition at discharge: Afebrile  Ambulating  Eating, Drinking, Voiding  Stable    Current Discharge Medication List      CONTINUE these medications which have NOT CHANGED    Details   metoprolol succinate (TOPROL-XL) 50 mg XL tablet Take 50 mg by mouth daily. escitalopram oxalate (LEXAPRO) 20 mg tablet Take 20 mg by mouth daily. PCP:  Berry Stapleton MD        Disposition:  Clear for discharge to home, if clear by medicine service. Follow-up in the office in  1 month with Dr. Ernestine Mejias; call [phone] to schedule appointment. Percocet for pain.     Wound Care: open to air POD         Jaiden Milton PA-C  4/6/2018  Office [phone]  Cell [phone]

## 2018-04-06 NOTE — PROGRESS NOTES
Offered the pt's  [facility] for home care, he chose [facility]  # [phone].  verified the contact information on the face sheet is correct. Referral sent to them via [email or web address] and called to the intake rep Sandra Macdonald. Informed her the pt will dc home Sat 4-7-18. Delivered the pt a rolling walker from the [facility] [employer]. Faxed the referral and the signed delivery ticket to [facility] for processing. Care Management Interventions  PCP Verified by CM:  Yes  Palliative Care Criteria Met (RRAT>21 & CHF Dx)?: No  Mode of Transport at Discharge: Self  Transition of Care Consult (CM Consult): Home Health ([address])  [address] No  Reason Outside [place]: Out of service area  Physical Therapy Consult: Yes  Current Support Network: Lives with Spouse  Confirm Follow Up Transport: Family  Plan discussed with Pt/Family/Caregiver: Yes  Freedom of Choice Offered: Yes  Discharge Location  Discharge Placement: Home with home health

## 2018-04-06 NOTE — PROGRESS NOTES
Bedside shift change report given to Yung Jolly RN (oncoming nurse) by Lonny Luo RN (offgoing nurse). Report included the following information SBAR, Kardex, OR Summary, Intake/Output, MAR and Recent Results. 2056: Patient states she was OOB to bedside commode, informed patient to call staff via call bell for toileting to reduce risk of falls, patient is fall risk, toileting has been offered throughout hourly rounding, Tylenol given for pain, 650 mg, order for scheduled 1,000 mg however patient requesting PRN now, will hold scheduled dose (2100) and resume schedule as ordered. 2130: Patient now demonstrating proper usage of call bell, pressing call light for assistance with toileting needs, ambulating to bedside commode without difficulty. 2340: Patient asleep.     0522: Patient asleep. Bedside shift change report given to Babak Andrew RN (oncoming nurse) by Yung Jolly RN (offgoing nurse). Report included the following information SBAR, Kardex, OR Summary, Intake/Output, MAR and Recent Results.

## 2018-04-06 NOTE — PROGRESS NOTES
Problem: Mobility Impaired (Adult and Pediatric)  Goal: *Acute Goals and Plan of Care (Insert Text)  Physical Therapy Goals  Initiated 4/5/2018 and to be accomplished within 7 day(s)  1. Patient will move from supine to sit and sit to supine  in bed with modified independence. 2.  Patient will perform sit to stand with modified independence. 3.  Patient will transfer from bed to chair and chair to bed with modified independence using the least restrictive device. 4.  Patient will ambulate with modified independence for 250 feet with the least restrictive device. 5.  Patient will ascend/descend 3 stairs with handrail(s) with supervision/set-up. 6.  Patient will verbalize 3/3 lumbar precautions. 7.  Patient will demonstrate compliance with lumbar precautions greater than 90% of session. 8. Patient will demonstrate appropriate use of incentive spirometer to aid in pulomnary compliance for mobility. Outcome: Progressing Towards Goal  physical Therapy TREATMENT    Patient: Sarah Castaneda (58 y.o. female)  Date: 4/6/2018  Diagnosis: ddd/ stenosis  m51.36  m48.061  Lumbar stenosis <principal problem not specified>  Procedure(s) (LRB):  decompression/fusion,polaris,posterolateral spine fusion l3-s1,possible iliac bolts (N/A) 2 Days Post-Op  Precautions:  fall , lumbar  Chart, physical therapy assessment, plan of care and goals were reviewed. PLOF: lives w   independent w adl & mobility    ASSESSMENT: presents in bed c/o pain from head to toe; to include migraine; sever ms spasms. Reported to RN who reports no more pain medication at this time. May have Tylenol. With encouragement agreed to PT. Spent time sitting EOB before standing working on deep breathing , decrease sh elevation. Dependent for donning brace. Falling asleep on BSC. Supportive  at bedside and much appreciate his support in helping to mobilize patient. See below for mobility level. HR elevated 117-132.  Alerted Allegra, RN  EDUCATION: lumbar precautions & H.O. issued log roll.  VU log roll  Progression toward goals:        Improving slowly and progressing toward goals     PLAN:  Patient continues to benefit from skilled intervention to address the above impairments. Continue treatment per established plan of care. Discharge Recommendations:  home w [facility]   Further Equipment Recommendations for Discharge:  walker at bedside and adjusted      SUBJECTIVE:   Patient stated .    OBJECTIVE DATA SUMMARY:   Critical Behavior:  Neurologic State: Lethargic  Orientation Level: Oriented to person, Oriented to place  Cognition: Follows commands  Safety/Judgement: Fall prevention    G CODE:Mobility [id number] Current  CL= 60-79%  [id number] Goal  CI= 1-19%. The severity rating is based on the Other [facility] Standing Balance Scale  1+: Pt supports self with upper extremities but requires therapist assistance. Pt performs >50% effort. (Min assist). CL, 60% to <80% impaired. Bed Mobility:  Rolling: Minimum assistance;Assist x1;Other (comment) (uses rails and hob raised)  Supine to Sit: Moderate assistance; Additional time;Assist x2                Interventions: Safety awareness training; Tactile cues; Verbal cues; Other (comment) (log roll)  Transfers:  Sit to Stand: Minimum assistance; Additional time; Other (comment) (from raised eob)  Stand to Sit: Minimum assistance; Additional time;Assist x1  Bed to Chair: Minimum assistance;Assist x2  Interventions: Safety awareness training; Tactile cues; Verbal cues     Balance:  Sitting: Intact  Standing: With support  Standing - Static: Fair  Standing - Dynamic : Fair  Ambulation/Gait Training: pre gait only: taking steps bed to [facility] to chair w RW and MIN A to manage walker and for obstacle awareness, placement. 6 steps x 3 w rest in sitting bw each bout.   Eyes closed throughout    Vital Signs  Temp: 99.3 °F (37.4 °C)     Pulse (Heart Rate): (!) 120     BP: 117/71     Resp Rate: 16     O2 Sat (%): 96 %  Pain:  \"100\"  Pre treatment pain level:  Post treatment pain level:  Pain Scale 1: Numeric (0 - 10)  Pain Location 1: Head (\"like a bad migraine\")  Pain Description 1: Aching  Pain Intervention(s) 1: Medication (see MAR)  Activity Tolerance:   Poor. Limited by lethargy  After treatment:   Patient left in no apparent distress sitting up in chair w brace on. Call bell left within reach  [id number] present    present  Recommendations for nursing:  Written on communication board up w assist x 1    Mehran Montes PTA   Time Calculation: 39 mins    [address]    DO NOT TWIST your back. DO NOT BEND to  objects. Use the Squat Lift method or use a *REACHER STICK. Get out of bed using the Log Roll method. DO NOT LIFT more than 5 pounds until cleared by your physician. DO NOT RIDE in a car except to go home from the hospital or to see your physician. DO NOT DRIVE until cleared by your physician. Limit sitting to less than one hour at a time. Use a long handled back brush/sponge to wash your feet if you cannot reach them. Squat or sit on a chair when removing items from the refrigerator. Put all frequently used kitchen items within easy reach. Sit to put on pants, socks, and shoes. Do not perform lower body dressing while standing up. Carry items close to your body. If needed, sit on a shower chair and use an extended hand-held shower for bathing. Do not shower until cleared by physician. Conserve energy by pacing  yourself during your daily activities. *Reachers are available at local drug stores for about $10 - $15 or can be ordered from a catalog provided by the therapy department. In drug stores they are often sold  under the name of Maria Marks. 

## 2018-04-06 NOTE — PROGRESS NOTES
Dr Umesh Osman notified of pt's -127, Temp - 100.7 ax, /69, Pulse ox 90-92% on 2 lts O2 NC. Dr Umesh Osman also notified of RRT called at about 0100 with pt complaining of chest, shoulder, neck pain & 's & that Dr Jennifer Pope checked pt when RRT called. Results of EKG, WBC, & cardiac panel called to Dr Umesh Osman. Metoprolol ordered now & 2x/day.

## 2018-04-06 NOTE — PROGRESS NOTES
Problem: Mobility Impaired (Adult and Pediatric)  Goal: *Acute Goals and Plan of Care (Insert Text)  Physical Therapy Goals  Initiated 4/5/2018 and to be accomplished within 7 day(s)  1. Patient will move from supine to sit and sit to supine  in bed with modified independence. 2.  Patient will perform sit to stand with modified independence. 3.  Patient will transfer from bed to chair and chair to bed with modified independence using the least restrictive device. 4.  Patient will ambulate with modified independence for 250 feet with the least restrictive device. 5.  Patient will ascend/descend 3 stairs with handrail(s) with supervision/set-up. 6.  Patient will verbalize 3/3 lumbar precautions. 7.  Patient will demonstrate compliance with lumbar precautions greater than 90% of session. 8. Patient will demonstrate appropriate use of incentive spirometer to aid in pulomnary compliance for mobility. Outcome: Progressing Towards Goal  physical Therapy TREATMENT    Patient: Laurent Schirmer (71 y.o. female)  Date: 4/6/2018  Diagnosis: ddd/ stenosis  m51.36  m48.061  Lumbar stenosis <principal problem not specified>  Procedure(s) (LRB):  decompression/fusion,polaris,posterolateral spine fusion l3-s1,possible iliac bolts (N/A) 2 Days Post-Op  Precautions:  fall, lumbar, brace  Chart, physical therapy assessment, plan of care and goals were reviewed. PLOF: ind    ASSESSMENT:  Presents in chair w brace on. Lethargic>sleeping. Requested toileting. Pt demo'ed transfer ch> BSC MIN A x 1 to Bed ; AMB 8 steps x 2. Returned to bed: req Mod to max a  2 for LE /trunk mgt., max cues to keep LE's bent and roll to supine. Appeared comfortable with this. . Unable to participate in LE ex post op spine 2/2 lethargy. Instructed  in AP, quad and glut isometric and he varbaliezed understanding. Unable to particAPte in ICS 2./2 lethargy.   EDUCATION: see above  Progression toward goals:            Improving slowly and progressing toward goals     PLAN:  Patient continues to benefit from skilled intervention to address the above impairments. Continue treatment per established plan of care. [facility] depending on mentation  Further Equipment Recommendations for Discharge:  RW to bedside and height adjusted     SUBJECTIVE:   Patient stated Ill make you a costume.     OBJECTIVE DATA SUMMARY:   Critical Behavior:  Neurologic State: Lethargic  Orientation Level: Oriented to person, Oriented to place  Cognition: Follows commands  Safety/Judgement: Fall prevention    G CODE:Mobility [id number] Current  CL= 60-79%  [id number] Goal  CI= 1-19%. The severity rating is based on the Other [facility] Standing Balance Scale  1+: Pt supports self with upper extremities but requires therapist assistance. Pt performs >50% effort. (Min assist). CL, 60% to <80% impaired. Functional Mobility Training:  Bed Mobility:  Rolling: Minimum assistance;Assist x1;  Sit to sup:   Moderate assistance;Maximum assistance; Additional time;Assist x2              Interventions: Safety awareness training; Tactile cues; Verbal cues (log roll)  Transfers:  Sit to Stand: Minimum assistance; Additional time; Other (comment) ( SBA)  Stand to Sit: Contact guard assistance;Minimum assistance, max VC ; Additional time;Assist x1  Bed to Chair: Minimum assistance; Additional time;Assist x1: SBA of a 2nd person  Interventions: Safety awareness training; Tactile cues; Verbal cues  Balance:  Sitting: Intact  Standing: Impaired; With support (cues not to pull tostand on walker)  Standing - Static: Good;Fair  Standing - Dynamic : Fair  Ambulation/Gait Training: pre gait only  Vital Signs  Temp: 98.3 °F (36.8 °C)     Pulse (Heart Rate): 98     BP: 115/79     Resp Rate: 16     O2 Sat (%): 96 %  Pain:  Pre treatment pain level:  4  Post treatment pain level:  4  Pain Scale 1: Numeric (0 - 10)  Pain Location 1: Head (\"like a bad migraine\")  Activity Tolerance:   poor  After treatment:  Patient left in no apparent distress in bed; SCD's applied  Call bell left within reach  Nursing notified   present    Hernando Miranda PTA   Time Calculation: 23 mins

## 2018-04-06 NOTE — PROGRESS NOTES
POD 2  Lumbar spine decompression and fusion for lumbar spine stenosis and spondylosis     HPI  C/O migraine H/A  No CP SOB  Asking for regular food  Nauseated earlier; No vomiting  Some back pain  Out of bed twice to chair  Urinary retention last night; voided after [place]    ACTIVE MEDICAL PROBLEMS  Paroxysmal atrial tachycardia   Palpitations  Premature ventricular contractions   GERD  Dpression     Past Medical History:   Diagnosis Date    Adverse effect of anesthesia     Pt. awakens screaming and flailing    Paroxysmal atrioventricular tachycardia (HCC)      Echocardiogram 01/17/2017:  NORMAL GLOBAL LEFT VENTRICULAR SYSTOLIC FUNCTION WITH AN ESTIMATED EJECTION FRACTION OF  65%. NORMAL DIASTOLIC FUNCTION. NORMAL CHAMBER SIZES. MILD DIFFUSE THICKENING (SCLEROSIS) OF THE AORTIC VALVE CUSPS WITHOUT REDUCED EXCURSION. NO HEMODYNAMICALLY SIGNIFICANT VALVULAR PATHOLOGY. NORMAL PULMONARY ARTERY PRESSURE OF 26 MMHG. NO PREVIOUS REPORT FOR COMPARISON. Past Surgical History:   Procedure Laterality Date    HX HYSTERECTOMY      HX LUMBAR DISKECTOMY      HX LUMBAR DISKECTOMY      HX ORTHOPAEDIC      Ulnar nrve revision    HX ORTHOPAEDIC      Brachial nerve revision    HX ORTHOPAEDIC Right     trigger finer release       Prescriptions Prior to Admission   Medication Sig    metoprolol succinate (TOPROL-XL) 50 mg XL tablet Take 50 mg by mouth daily.  escitalopram oxalate (LEXAPRO) 20 mg tablet Take 20 mg by mouth daily. Nexium OTC one capsule PO daily     ALLERGIES: NKA      Social History     Social History    Marital status:      Spouse name: N/A    Number of children: N/A    Years of education: N/A     Occupational History    Not on file.      Social History Main Topics    Smoking status: Never Smoker    Smokeless tobacco: Never Used    Alcohol use No    Drug use: No    Sexual activity: Not on file     Other Topics Concern    Not on file     Social History Narrative    ROS  Constitutional:  Headache  Cardiac: No CP. TABOR. No palpitation  Respiratory: No cough . No SOB. No wheezing  GI: no hiccups. No nausea . No vomiting. No reflux. No abdominal pain. No BM . No flatus  M/S: + back pain No muscle spasms  Neuro: No weakness. No numbness. Skin: No rash. No itching    PHYSICAL EXAMINATION  GENERAL:NAD  HEENT:    LUNGS: CTA. BS Normal Bilaterally  HEART: RRR   Normal S1 S2. No Significant murmur. No S3 S4  ABDOMEN: Soft. Normal BS. No suprapubic tenderness. LE: No edema. SCD in place  PULSES: Radial 2+;  PT 2+                                      Recent Results (from the past 24 hour(s))   EKG, 12 LEAD, INITIAL    Collection Time: 04/06/18 12:50 AM   Result Value Ref Range    Ventricular Rate 118 BPM    Atrial Rate 118 BPM    P-R Interval 188 ms    QRS Duration 76 ms    Q-T Interval 220 ms    QTC Calculation (Bezet) 308 ms    Calculated P Axis 25 degrees    Calculated R Axis 5 degrees    Calculated T Axis 50 degrees    Diagnosis       Sinus tachycardia  Anterior infarct , age undetermined Possible  ST & T wave abnormality, consider lateral ischemia  Abnormal ECG  No previous ECGs available  Confirmed by Katelyn Harrington ([phone]) on 4/6/2018 11:21:05 AM     CBC WITH AUTOMATED DIFF    Collection Time: 04/06/18  1:00 AM   Result Value Ref Range    WBC 13.7 (H) 4.6 - 13.2 K/uL    RBC 3.10 (L) 4.20 - 5.30 M/uL    HGB 9.5 (L) 12.0 - 16.0 g/dL    HCT 29.6 (L) 35.0 - 45.0 %    MCV 95.5 74.0 - 97.0 FL    MCH 30.6 24.0 - 34.0 PG    MCHC 32.1 31.0 - 37.0 g/dL    RDW 14.1 11.6 - 14.5 %    PLATELET 413 817 - 136 K/uL    MPV 10.0 9.2 - 11.8 FL    NEUTROPHILS 71 40 - 73 %    LYMPHOCYTES 18 (L) 21 - 52 %    MONOCYTES 11 (H) 3 - 10 %    EOSINOPHILS 0 0 - 5 %    BASOPHILS 0 0 - 2 %    ABS. NEUTROPHILS 9.7 (H) 1.8 - 8.0 K/UL    ABS. LYMPHOCYTES 2.4 0.9 - 3.6 K/UL    ABS. MONOCYTES 1.5 (H) 0.05 - 1.2 K/UL    ABS. EOSINOPHILS 0.0 0.0 - 0.4 K/UL    ABS.  BASOPHILS 0.0 0.0 - 0.06 K/UL    DF AUTOMATED METABOLIC PANEL, BASIC    Collection Time: 04/06/18  1:00 AM   Result Value Ref Range    Sodium 143 136 - 145 mmol/L    Potassium 3.7 3.5 - 5.5 mmol/L    Chloride 109 (H) 100 - 108 mmol/L    CO2 27 21 - 32 mmol/L    Anion gap 7 3.0 - 18 mmol/L    Glucose 124 (H) 74 - 99 mg/dL    BUN 7 7.0 - 18 MG/DL    Creatinine 0.68 0.6 - 1.3 MG/DL    BUN/Creatinine ratio 10 (L) 12 - 20      GFR est AA >60 >60 ml/min/1.73m2    GFR est non-AA >60 >60 ml/min/1.73m2    Calcium 7.8 (L) 8.5 - 10.1 MG/DL   CARDIAC PANEL,(CK, CKMB & TROPONIN)    Collection Time: 04/06/18  1:00 AM   Result Value Ref Range    CK 1303 (H) 26 - 192 U/L    CK - MB 13.0 (H) <3.6 ng/ml    CK-MB Index 1.0 0.0 - 4.0 %    Troponin-I, Qt. <0.02 0.0 - 0.045 NG/ML     ASSESSMENT  Post lumbar spine decompression infusion  H/A  Post anemia  Mild leucocytosis  Urinary retention; post SC voiding  PAT  GERD  Depression    PLAN  Restart escitalopram  Tentative D/C in AM      Saud Harrison MD   04/06/18, 7:30 PM

## 2018-04-06 NOTE — PROGRESS NOTES
Problem: Falls - Risk of  Goal: *Absence of Falls  Document Elijah Fall Risk and appropriate interventions in the flowsheet.    Outcome: Progressing Towards Goal  Fall Risk Interventions:  Mobility Interventions: Patient to call before getting OOB    Mentation Interventions: Door open when patient unattended, Increase mobility    Medication Interventions: Patient to call before getting OOB    Elimination Interventions: Call light in reach

## 2018-04-06 NOTE — PROGRESS NOTES
Progress Note      Post Operative Day: 2    Assessment:    1. Status post  LAMINEC/FACETECT/FORAMIN,LUMBAR [83875] (SPINE LUMBAR POSTERIOR INTERBODY FUSION (PLIF))  LAMINEC/FACETECT/FORAMIN,EACH ADDNL [06860]  [place] ARTHRODESIS POSTERIOR/POSTEROLATERAL LUMBAR [91531]  SPINE FUSN,POST TECH,EA ADDNL SGMT [08584]  INSERT VERT FIX DEV,POST,3-6 SGMTS [12958]  INSERT PELVIC FIXATION DEVICE [78319]  SPIN BONE AUTOGRFT LOCAL [59386] for ddd/ stenosis  m51.36  m48.061  Lumbar stenosis 4/4/2018,   Progressing. PLAN:    1. Mobilize. Continue P.T.  2.  WBAT  3. Brace  4. Discharge Planning home tomorrow if doing well           HPI: Kimmy Smith is a 63 y.o. female patient without new complaints status post fusion for ddd/ stenosis  m51.36  m48.061  Lumbar stenosis 4/4/2018. No new orthopaedic changes. Blood pressure 106/67, pulse (!) 118, temperature 99.3 °F (37.4 °C), resp. rate 16, height 5' 3\" (1.6 m), weight 78 kg (172 lb), SpO2 92 %. CBC w/Diff   Lab Results   Component Value Date/Time    WBC 13.7 (H) 04/06/2018 01:00 AM    RBC 3.10 (L) 04/06/2018 01:00 AM    HCT 29.6 (L) 04/06/2018 01:00 AM          Physical Assessment:  General: in no apparent distress   Extremities:  Neurovascular intact    Dressing:  dry   DVT Exam:   No exam evidence to suggest DVT. Compartments soft and NT.           Radiology: none         Slime Bingham PA-C  4/6/2018  Office [phone]

## 2018-04-06 NOTE — PROGRESS NOTES
Pt unable to urinate. Bladder scan 324. Pt straight cath without any difficulty for 400 ml of clear kylah urine. Krystal Barclay RN assisted with straight cath.

## 2018-04-06 NOTE — ROUTINE PROCESS
Bedside and Verbal shift change report given to Laurie Leiva RN (oncoming nurse) by Nate Rodriguez RN   (offgoing nurse). Report included the following information SBAR, Kardex and MAR.

## 2018-04-07 VITALS
HEIGHT: 63 IN | SYSTOLIC BLOOD PRESSURE: 107 MMHG | WEIGHT: 172 LBS | DIASTOLIC BLOOD PRESSURE: 70 MMHG | HEART RATE: 88 BPM | TEMPERATURE: 98.9 F | RESPIRATION RATE: 16 BRPM | OXYGEN SATURATION: 94 % | BODY MASS INDEX: 30.48 KG/M2

## 2018-04-07 PROCEDURE — 74011250637 HC RX REV CODE- 250/637: Performed by: INTERNAL MEDICINE

## 2018-04-07 PROCEDURE — 97530 THERAPEUTIC ACTIVITIES: CPT

## 2018-04-07 RX ORDER — CYCLOBENZAPRINE HCL 10 MG
5 TABLET ORAL
Status: DISCONTINUED | OUTPATIENT
Start: 2018-04-07 | End: 2018-04-07

## 2018-04-07 RX ADMIN — ACETAMINOPHEN 1000 MG: 500 TABLET ORAL at 08:22

## 2018-04-07 RX ADMIN — PANTOPRAZOLE SODIUM 40 MG: 40 TABLET, DELAYED RELEASE ORAL at 08:22

## 2018-04-07 RX ADMIN — METOPROLOL TARTRATE 25 MG: 25 TABLET ORAL at 08:39

## 2018-04-07 NOTE — PROGRESS NOTES
Problem: Falls - Risk of  Goal: *Absence of Falls  Document Elijah Fall Risk and appropriate interventions in the flowsheet.    Outcome: Progressing Towards Goal  Fall Risk Interventions:  Mobility Interventions: Communicate number of staff needed for ambulation/transfer, Patient to call before getting OOB, Utilize walker, cane, or other assitive device, PT Consult for mobility concerns, PT Consult for assist device competence    Mentation Interventions: Door open when patient unattended, Toileting rounds, Increase mobility, More frequent rounding    Medication Interventions: Teach patient to arise slowly, Patient to call before getting OOB    Elimination Interventions: Call light in reach, Patient to call for help with toileting needs, Toileting schedule/hourly rounds, Toilet paper/wipes in reach

## 2018-04-07 NOTE — PROGRESS NOTES
Patient with many complaints (HA from pain meds, LBP, uncomfortable bed). Not ambulating much. Voiding. Visit Vitals    /70 (BP 1 Location: Right arm, BP Patient Position: At rest)    Pulse 88    Temp 98.9 °F (37.2 °C)    Resp 16    Ht 5' 3\" (1.6 m)    Wt 78 kg (172 lb)    SpO2 94%    BMI 30.47 kg/m2       CBC w/Diff    Lab Results   Component Value Date/Time    WBC 13.7 (H) 04/06/2018 01:00 AM    RBC 3.10 (L) 04/06/2018 01:00 AM    HCT 29.6 (L) 04/06/2018 01:00 AM    MCV 95.5 04/06/2018 01:00 AM    MCH 30.6 04/06/2018 01:00 AM    MCHC 32.1 04/06/2018 01:00 AM    RDW 14.1 04/06/2018 01:00 AM    Lab Results   Component Value Date/Time    MONOS 11 (H) 04/06/2018 01:00 AM    EOS 0 04/06/2018 01:00 AM    BASOS 0 04/06/2018 01:00 AM    RDW 14.1 04/06/2018 01:00 AM          Physical exam: aaox3, surgical dressing dry, bilateral anterior tibialis and gastrocnemius strength 5/5 , palpable distal pulses, sensation intact,  BLE compartments soft  and nontender. Assessment:  Status post LAMINEC/FACETECT/FORAMIN,LUMBAR [39368] (SPINE LUMBAR POSTERIOR INTERBODY FUSION (PLIF))  LAMINEC/FACETECT/FORAMIN,EACH ADDNL [91835]  [place] ARTHRODESIS POSTERIOR/POSTEROLATERAL LUMBAR [61040]  SPINE FUSN,POST TECH,EA ADDNL SGMT [22455]  INSERT VERT FIX DEV,POST,3-6 SGMTS [81864]  INSERT PELVIC FIXATION DEVICE [24244]  SPIN BONE AUTOGRFT LOCAL [86975] for ddd/ stenosis  m51.36  m48.061  Lumbar stenosis ,  progressing. PLAN:  Mobilize with P.T. Encourage back off narcotics.    Complaining of poor pain control/ but very sedated with anything narcotic    DEVONTE Trent  April 7, 2018

## 2018-04-07 NOTE — PROGRESS NOTES
Problem: Mobility Impaired (Adult and Pediatric)  Goal: *Acute Goals and Plan of Care (Insert Text)  Physical Therapy Goals  Initiated 4/5/2018 and to be accomplished within 7 day(s)  1. Patient will move from supine to sit and sit to supine  in bed with modified independence. 2.  Patient will perform sit to stand with modified independence. 3.  Patient will transfer from bed to chair and chair to bed with modified independence using the least restrictive device. 4.  Patient will ambulate with modified independence for 250 feet with the least restrictive device. 5.  Patient will ascend/descend 3 stairs with handrail(s) with supervision/set-up. 6.  Patient will verbalize 3/3 lumbar precautions. 7.  Patient will demonstrate compliance with lumbar precautions greater than 90% of session. 8. Patient will demonstrate appropriate use of incentive spirometer to aid in pulomnary compliance for mobility. physical Therapy TREATMENT    Patient: Avis Dunn (34 y.o. female)  Date: 4/7/2018  Diagnosis: ddd/ stenosis  m51.36  m48.061  Lumbar stenosis <principal problem not specified>  Procedure(s) (LRB):  decompression/fusion,polaris,posterolateral spine fusion l3-s1,possible iliac bolts (N/A) 3 Days Post-Op  Precautions: falls, brace when up  Chart, physical therapy assessment, plan of care and goals were reviewed. 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 ASSESSMENT:  Pt was agitated with MD decision to DC her from facility. She was agreeable to limited skilled session that included lengthy education and mobility testing. Transfers gingerly with Reddy/CGA. Pt required increased time to complete tasks with transfers 2/2 weakness. EDUCATION:Pt educated on safety with stair negotiation and transfers. Suggest she does not use  as RW as he may hurt himself while trying to care for her.   Progression toward goals:        Improving appropriately and progressing toward goals        Improving slowly and progressing toward goals-yes        Not making progress toward goals and plan of care will be adjusted     PLAN:  Patient continues to benefit from skilled intervention to address the above impairments. Continue treatment per established plan of care. Discharge Recommendations:  Home Health  Further Equipment Recommendations for Discharge:  N/A     SUBJECTIVE:   Patient stated .    OBJECTIVE DATA SUMMARY:   Critical Behavior:  Neurologic State: Alert  Orientation Level: Oriented X4  Cognition: Appropriate for age attention/concentration, Appropriate safety awareness, Follows commands  Safety/Judgement: Fall prevention    G CODE:Mobility  Current  CJ= 20-39%. The severity rating is based on the functional assessment. Functional Mobility Training:  Bed Mobility:  Rolling: Contact guard assistance  Scooting: Minimum assistance; Moderate assistance         Transfers:  Sit to Stand: Contact guard assistance  Stand to Sit: Contact guard assistance  Stand Pivot Transfers: Minimum assistance     Bed to Chair: Minimum assistance  Balance:  Sitting: Impaired;High guard; Without support  Sitting - Static: Fair (occasional)  Sitting - Dynamic: Fair (occasional)  Standing: Impaired; With support  Standing - Static: Fair  Standing - Dynamic : Fair  Ambulation/Gait Training: Pt was given walker to use for transfers from the bed to the [facility] which she refused. She was able to rely on her  for support even after encouragement from therapist to avoid relying on spouse for transfers. Gait Description (WDL): Exceptions to WDL  Stairs:Pt was educated on the steps with sequencing and use of handrail, however she refused to attempt steps even when encouraged to do so by the therapist and her spouse. Pt reported the therapist, Vale De Los Santos you are going to throw me out of her, I want to rest before I have to go home and need to do the steps\".  Pt was educated at length regarding the MD orders and she reports clear understanding of same. Pt did appear more aroused and chatty in the stairwell as she began telling the therapist about the active lifestyle that she leads. Vital Signs  Temp: 98.9 °F (37.2 °C)     Pulse (Heart Rate): 88     BP: 107/70     Resp Rate: 16     O2 Sat (%): 94 %  Pain:  Pre treatment pain level:10 (Pt was given tylenol only for pain this PM)  Post treatment pain level:10  Pain Scale 1: Visual  Pain Intensity 1: 0      Activity Tolerance:  Pt educated at length regarding the need to mobilize after LB surgery with brace on at all times when she is up.   Gurvinder White   Time Calculation: 27 mins

## 2018-04-07 NOTE — PROGRESS NOTES
POD 7  Lumbar spine decompression and fusion for lumbar spine stenosis and spondylosis     HPI  No H/A  No CP SOB  Tolerating diet  No N/V  Some back pain  Not participating with PT  Voiding well      ACTIVE MEDICAL PROBLEMS  Paroxysmal atrial tachycardia   Palpitations  Premature ventricular contractions   GERD  Dpression     Past Medical History:   Diagnosis Date    Adverse effect of anesthesia     Pt. awakens screaming and flailing    Paroxysmal atrioventricular tachycardia (HCC)      Echocardiogram 01/17/2017:  NORMAL GLOBAL LEFT VENTRICULAR SYSTOLIC FUNCTION WITH AN ESTIMATED EJECTION FRACTION OF  65%. NORMAL DIASTOLIC FUNCTION. NORMAL CHAMBER SIZES. MILD DIFFUSE THICKENING (SCLEROSIS) OF THE AORTIC VALVE CUSPS WITHOUT REDUCED EXCURSION. NO HEMODYNAMICALLY SIGNIFICANT VALVULAR PATHOLOGY. NORMAL PULMONARY ARTERY PRESSURE OF 26 MMHG. NO PREVIOUS REPORT FOR COMPARISON. Past Surgical History:   Procedure Laterality Date    HX HYSTERECTOMY      HX LUMBAR DISKECTOMY      HX LUMBAR DISKECTOMY      HX ORTHOPAEDIC      Ulnar nrve revision    HX ORTHOPAEDIC      Brachial nerve revision    HX ORTHOPAEDIC Right     trigger finer release       Prescriptions Prior to Admission   Medication Sig    metoprolol succinate (TOPROL-XL) 50 mg XL tablet Take 50 mg by mouth daily.  escitalopram oxalate (LEXAPRO) 20 mg tablet Take 20 mg by mouth daily. Nexium OTC one capsule PO daily     ALLERGIES: NKA      Social History     Social History    Marital status:      Spouse name: N/A    Number of children: N/A    Years of education: N/A     Occupational History    Not on file. Social History Main Topics    Smoking status: Never Smoker    Smokeless tobacco: Never Used    Alcohol use No    Drug use: No    Sexual activity: Not on file     Other Topics Concern    Not on file     Social History Narrative        ROS  Constitutional:  No Headache  Cardiac: No CP. TABOR.  No palpitation  Respiratory: No cough . No SOB. No wheezing  GI: no hiccups. No nausea . No vomiting. No reflux. No abdominal pain. M/S: + back pain. No muscle spasms  Neuro: No weakness. No numbness. Skin: No rash or  itching    PHYSICAL EXAMINATION  GENERAL:NAD sleepy arousable conversative  HEENT:    LUNGS: CTA. BS Normal Bilaterally  HEART: RRR   Normal S1 S2. No Significant murmur. No S3 S4  ABDOMEN: Soft. Normal BS. No  tenderness. LE: No edema. SCD in place  PULSES: Radial 2+;  PT 2+                                      No results found for this or any previous visit (from the past 24 hour(s)).   ASSESSMENT  Post lumbar spine decompression infusion  Post anemia  Voiding well  PAT  GERD  Depression    PLAN  Stable to D/C home from [facility] with same PTA meds:  Metoprolol succinate ER 50mg po daily (Toprol-XL 50 mg)  Escitalopram (Lexapro) 20 mg po daily      Walt Grover MD   04/06/18, 7:30 PM

## 2018-04-07 NOTE — PROGRESS NOTES
0825 assisted patient to bedside commode, patient refused to sit in chair, assisted patient back to bed, am meds given, assessment complete .  Patient drowsy however easy to arouse     1117 patient made aware will be discharge today    [phone] discharge instruction given, patient verbalized understanding

## 2018-04-07 NOTE — DISCHARGE INSTRUCTIONS
DISCHARGE SUMMARY from Nurse    PATIENT INSTRUCTIONS:    After general anesthesia or intravenous sedation, for 24 hours or while taking prescription Narcotics:  · Limit your activities  · Do not drive and operate hazardous machinery  · Do not make important personal or business decisions  · Do  not drink alcoholic beverages  · If you have not urinated within 8 hours after discharge, please contact your surgeon on call. Report the following to your surgeon:  · Excessive pain, swelling, redness or odor of or around the surgical area  · Temperature over 100.5  · Nausea and vomiting lasting longer than 4 hours or if unable to take medications  · Any signs of decreased circulation or nerve impairment to extremity: change in color, persistent  numbness, tingling, coldness or increase pain  · Any questions    What to do at Home:  Recommended activity: Activity as tolerated. If you experience any of the following symptoms chills, temperature greater than 100.1, pain unrelieved by pain medication, nausea and vomiting lasting 4 hours or more, please follow up with MD Holly Mhamood. *  Please give a list of your current medications to your Primary Care Provider. *  Please update this list whenever your medications are discontinued, doses are      changed, or new medications (including over-the-counter products) are added. *  Please carry medication information at all times in case of emergency situations. These are general instructions for a healthy lifestyle:    No smoking/ No tobacco products/ Avoid exposure to second hand smoke  Surgeon General's Warning:  Quitting smoking now greatly reduces serious risk to your health.     Obesity, smoking, and sedentary lifestyle greatly increases your risk for illness    A healthy diet, regular physical exercise & weight monitoring are important for maintaining a healthy lifestyle    You may be retaining fluid if you have a history of heart failure or if you experience any of the following symptoms:  Weight gain of 3 pounds or more overnight or 5 pounds in a week, increased swelling in our hands or feet or shortness of breath while lying flat in bed. Please call your doctor as soon as you notice any of these symptoms; do not wait until your next office visit. Recognize signs and symptoms of STROKE:    F-face looks uneven    A-arms unable to move or move unevenly    S-speech slurred or non-existent    T-time-call 911 as soon as signs and symptoms begin-DO NOT go       Back to bed or wait to see if you get better-TIME IS BRAIN. Warning Signs of HEART ATTACK     Call 911 if you have these symptoms:   Chest discomfort. Most heart attacks involve discomfort in the center of the chest that lasts more than a few minutes, or that goes away and comes back. It can feel like uncomfortable pressure, squeezing, fullness, or pain.  Discomfort in other areas of the upper body. Symptoms can include pain or discomfort in one or both arms, the back, neck, jaw, or stomach.  Shortness of breath with or without chest discomfort.  Other signs may include breaking out in a cold sweat, nausea, or lightheadedness. Don't wait more than five minutes to call 911 - MINUTES MATTER! Fast action can save your life. Calling 911 is almost always the fastest way to get lifesaving treatment. Emergency Medical Services staff can begin treatment when they arrive -- up to an hour sooner than if someone gets to the hospital by car. The discharge information has been reviewed with the patient. The patient verbalized understanding. Discharge medications reviewed with the patient and appropriate educational materials and side effects teaching were provided.   ___________________________________________________________________________________________________________________________________      Discharge medications reviewed with the patient and appropriate educational materials and side effects teaching were provided.     Patient armband removed and shredded

## 2018-04-09 NOTE — PROGRESS NOTES
Notified by [facility] rep,Snehal, the pt refused home care services reporting\", I'm fine, don't want it , don't want to pay the copays for the visits\". Dr. Rober Burgos , Chyna Call, notified.

## 2020-03-11 ENCOUNTER — HOSPITAL ENCOUNTER (OUTPATIENT)
Dept: CT IMAGING | Age: 60
Discharge: HOME | End: 2020-03-11
Attending: ORTHOPAEDIC SURGERY
Payer: MEDICARE

## 2020-03-11 ENCOUNTER — HOSPITAL ENCOUNTER (OUTPATIENT)
Dept: GENERAL RADIOLOGY | Age: 60
Discharge: HOME | End: 2020-03-11
Attending: RADIOLOGY | Admitting: RADIOLOGY
Payer: MEDICARE

## 2020-03-11 VITALS
BODY MASS INDEX: 32.8 KG/M2 | OXYGEN SATURATION: 95 % | WEIGHT: 185.1 LBS | DIASTOLIC BLOOD PRESSURE: 56 MMHG | TEMPERATURE: 98.5 F | RESPIRATION RATE: 16 BRPM | HEART RATE: 69 BPM | SYSTOLIC BLOOD PRESSURE: 102 MMHG | HEIGHT: 63 IN

## 2020-03-11 DIAGNOSIS — M48.061 SPINAL STENOSIS, LUMBAR REGION, WITHOUT NEUROGENIC CLAUDICATION: ICD-10-CM

## 2020-03-11 DIAGNOSIS — M51.36 DEGENERATION OF LUMBAR INTERVERTEBRAL DISC: ICD-10-CM

## 2020-03-11 DIAGNOSIS — Z98.1 ARTHRODESIS STATUS: ICD-10-CM

## 2020-03-11 DIAGNOSIS — M54.50 LUMBAGO: ICD-10-CM

## 2020-03-11 PROCEDURE — 74011000250 HC RX REV CODE- 250: Performed by: RADIOLOGY

## 2020-03-11 PROCEDURE — 74011636320 HC RX REV CODE- 636/320: Performed by: RADIOLOGY

## 2020-03-11 PROCEDURE — 74011250637 HC RX REV CODE- 250/637: Performed by: PHYSICIAN ASSISTANT

## 2020-03-11 PROCEDURE — 62304 MYELOGRAPHY LUMBAR INJECTION: CPT

## 2020-03-11 PROCEDURE — 72132 CT LUMBAR SPINE W/DYE: CPT

## 2020-03-11 RX ORDER — DIAZEPAM 5 MG/1
10 TABLET ORAL ONCE
Status: COMPLETED | OUTPATIENT
Start: 2020-03-11 | End: 2020-03-11

## 2020-03-11 RX ORDER — LIDOCAINE HYDROCHLORIDE 10 MG/ML
5 INJECTION, SOLUTION EPIDURAL; INFILTRATION; INTRACAUDAL; PERINEURAL
Status: COMPLETED | OUTPATIENT
Start: 2020-03-11 | End: 2020-03-11

## 2020-03-11 RX ORDER — HYDROCODONE BITARTRATE AND ACETAMINOPHEN 5; 325 MG/1; MG/1
1-2 TABLET ORAL
Status: DISCONTINUED | OUTPATIENT
Start: 2020-03-11 | End: 2020-03-12 | Stop reason: HOSPADM

## 2020-03-11 RX ORDER — ACETAMINOPHEN 325 MG/1
650 TABLET ORAL
Status: DISCONTINUED | OUTPATIENT
Start: 2020-03-11 | End: 2020-03-12 | Stop reason: HOSPADM

## 2020-03-11 RX ADMIN — DIAZEPAM 10 MG: 5 TABLET ORAL at 11:14

## 2020-03-11 RX ADMIN — LIDOCAINE HYDROCHLORIDE 5 ML: 10 INJECTION, SOLUTION EPIDURAL; INFILTRATION; INTRACAUDAL; PERINEURAL at 12:05

## 2020-03-11 RX ADMIN — LIDOCAINE HYDROCHLORIDE 5 ML: 10 INJECTION, SOLUTION EPIDURAL; INFILTRATION; INTRACAUDAL; PERINEURAL at 12:00

## 2020-03-11 RX ADMIN — IOPAMIDOL 10 ML: 408 INJECTION, SOLUTION INTRATHECAL at 12:00

## 2020-03-11 NOTE — PROGRESS NOTES
The patient had a lumbar myelogram with Nahed WHITNEY  pager number [phone]. Pt has a bandaid on her low back and current pain level is a 0 /10. This was verbally handed off to Elzbieta Clemente in [facility].

## 2020-03-11 NOTE — PERIOP NOTES
Phase 2 Recovery Summary    Report received from Candace Deal RN    Vitals:    03/11/20 1245 03/11/20 1248 03/11/20 1250 03/11/20 1300   BP: 109/56 109/56  107/62   Pulse:       Resp:  16     Temp:       SpO2:  98% 92% 100%   Weight:       Height:           oriented to time, place, person and situation  Denies pain  Band-Aid on back is dry and intact. Patient instructed in Myelogram recovery. Verbalized understanding. Beverage and boxed lunch provided. Call light within reach. Patient's family at bedside. Discharge discussed with patient and family. No questions or concerns at this time. Patient had time to ask any questions. Discharge medications reviewed with patient and spouse and appropriate educational materials and side effects teaching were provided. Patient discharged to home with .       Dennie Miracle, RN

## 2020-03-11 NOTE — PERIOP NOTES
Valium 10 mg given PO preop for myelogram. States she is unable to be on prone position without sedation. Callight in reach. Instructed to call for BR needs.

## 2020-03-11 NOTE — PROCEDURES
Interventional Radiology Brief Procedure Note    Performed By: Sujit Larsen PA-C    Attending Radiologist: Clary Dominguez MD    Pre-operative Diagnosis:  Low back pain & bilateral leg radiculopathy R > L    Post-operative Diagnosis: Same as pre-op diagnosis    Procedure(s) Performed:  Lumbar Myelogram    Anesthesia:  Local Anesthesia    Findings:  The patient's low back was prepped in the usual sterile manner. 1% Lidocaine was used for local anesthesia. A 22 G spinal needle was advanced into spinal subarachnoid space via midline approach at L3.  15 cc of Isovue 200M was instilled into dural sac. Images and subsequent CT scan to follow. Complications: None immediate    Estimated Blood Loss:  Minimal    Tubes and Drains: None    Specimens: None    Condition: Good    Disposition:  Observation x 4 hours, then D/C home.     Sujit Larsen PA-C  [address] [place] [address],[facility]  Vascular & Interventional Radiology  3/11/2020

## 2020-03-11 NOTE — DISCHARGE INSTRUCTIONS
Patient Education   Patient armband removed and shredded    DISCHARGE SUMMARY from Nurse    PATIENT INSTRUCTIONS:    After general anesthesia or intravenous sedation, for 24 hours or while taking prescription Narcotics:  · Limit your activities  · Do not drive and operate hazardous machinery  · Do not make important personal or business decisions  · Do  not drink alcoholic beverages  · If you have not urinated within 8 hours after discharge, please contact your surgeon on call. Report the following to your surgeon:  · Excessive pain, swelling, redness or odor of or around the surgical area  · Temperature over 100.5  · Nausea and vomiting lasting longer than 4 hours or if unable to take medications  · Any signs of decreased circulation or nerve impairment to extremity: change in color, persistent  numbness, tingling, coldness or increase pain  · Any questions    What to do at Home:    These are general instructions for a healthy lifestyle:    No smoking/ No tobacco products/ Avoid exposure to second hand smoke  Surgeon General's Warning:  Quitting smoking now greatly reduces serious risk to your health. Obesity, smoking, and sedentary lifestyle greatly increases your risk for illness    A healthy diet, regular physical exercise & weight monitoring are important for maintaining a healthy lifestyle    You may be retaining fluid if you have a history of heart failure or if you experience any of the following symptoms:  Weight gain of 3 pounds or more overnight or 5 pounds in a week, increased swelling in our hands or feet or shortness of breath while lying flat in bed. Please call your doctor as soon as you notice any of these symptoms; do not wait until your next office visit. The discharge information has been reviewed with the patient. The patient verbalized understanding.   Discharge medications reviewed with the patient and appropriate educational materials and side effects teaching were provided. ___________________________________________________________________________________________________________________________________     Myelogram: About This Test  What is it? A myelogram uses X-rays and a special dye to make pictures of bones and nerves of the spine (spinal canal). The spinal canal holds the spinal cord, the spinal nerve roots, and the fluid-filled space between the bones in your spine. Why is this test done? A myelogram is done to check for:  · The cause of arm or leg numbness, weakness, or pain. · Narrowing of the spinal canal (spinal stenosis). · A tumor or infection causing problems with the spinal cord or nerve roots. · A spinal disc that has ruptured (herniated disc). · Inflammation of the membrane that covers the brain and spinal cord. · Problems with the blood vessels to the spine. How can you prepare for the test?  Your doctor will tell you if you need to change how much you eat and drink before the myelogram. You may be asked to increase the amount of water you drink before the test. Follow the instructions your doctor gives you about eating and drinking. Before a myelogram, tell your doctor if:  · You are allergic to any medicines, contrast material, or iodine dye. · You have had bleeding problems, or you take a blood thinner, such as aspirin, clopidogrel (Plavix), or warfarin (Coumadin), or you take over-the-counter medicines, such as ibuprofen (Advil, Motrin) or naproxen (Aleve). Your doctor will tell you when you should stop taking these medicines several days before your procedure. Make sure that you understand exactly what he or she wants you to do. · You have had kidney problems. · You have diabetes, especially if you take metformin (Glucophage, for example). · You are or might be pregnant. Ask someone to take you home and stay with you after the test.  What happens during the test?  The dye is put into your spinal canal with a thin needle.  This is called a lumbar puncture. The dye moves through the space so the nerve roots and spinal cord can be seen more clearly. After the dye is put in, you will lie still while the X-ray pictures are taken. How does it feel? You will feel a quick sting from a small needle that has medicine to numb the skin on your back. You will also feel some pressure as the long, thin spinal needle is put into your spinal canal. You may feel a quick sharp pain down your buttock or leg when the needle is moved in your spine. The dye may make you feel warm and flushed and have a metallic taste in your mouth. What else should you know about the test?  In rare cases, the hole made by the needle in the sac around the spine does not close normally. This can allow spinal fluid to leak out. This leak may need to be repaired through a procedure called an epidural blood patch. To do the patch, your doctor injects some of your own blood to cover the hole. How long does the test take? · A myelogram usually takes 30 minutes to 1 hour. What happens after the test?  · You will probably be able to go home 30 minutes to 2 hours after the test.  · You may need to lie in bed with your head raised for 4 to 24 hours after the test. To prevent seizures, which are a rare side effect, do not bend over or lie down with your head lower than your body. Keeping your head higher than your body after a myelogram also may help prevent or reduce other side effects, such as headache, nausea, or vomiting. · Avoid strenuous activity, such as running or heavy lifting, for at least 1 day after your myelogram.  · Drink plenty of water after the myelogram. Your doctor will give you instructions on taking your regular medicines. When should you call for help? Call 911 anytime you think you may need emergency care. For example, call if:  · You have a seizure.   Call your doctor now or seek immediate medical care if:  · You have any increase in pain, weakness, or numbness in your legs. · You have a severe headache or stiff neck, or your eyes become very sensitive to light. · You have a headache that lasts longer than 24 hours. · You have problems urinating or having a bowel movement. · You have a fever. Follow-up care is a key part of your treatment and safety. Be sure to make and go to all appointments, and call your doctor if you are having problems. It's also a good idea to keep a list of the medicines you take. Ask your doctor when you can expect to have your test results. Where can you learn more? Go to http://karli-linsey.info/. Enter D952 in the search box to learn more about \"Myelogram: About This Test.\"  Current as of: March 28, 2019  Content Version: 12.2  © 7809-5345 [employer]. Care instructions adapted under license by [employer] (which disclaims liability or warranty for this information). If you have questions about a medical condition or this instruction, always ask your healthcare professional. [address] any warranty or liability for your use of this information.

## 2020-07-20 ENCOUNTER — HOSPITAL ENCOUNTER (OUTPATIENT)
Dept: PREADMISSION TESTING | Age: 60
Discharge: HOME | End: 2020-07-20
Payer: MEDICARE

## 2020-07-20 DIAGNOSIS — Z01.818 PREOPERATIVE TESTING: ICD-10-CM

## 2020-07-20 PROCEDURE — 87635 SARS-COV-2 COVID-19 AMP PRB: CPT

## 2020-07-22 LAB — SARS-COV-2, COV2NT: NOT DETECTED

## 2020-07-23 ENCOUNTER — ANESTHESIA EVENT (OUTPATIENT)
Dept: SURGERY | Age: 60
DRG: 454 | End: 2020-07-23
Payer: MEDICARE

## 2020-07-23 RX ORDER — ATORVASTATIN CALCIUM 10 MG/1
10 TABLET, FILM COATED ORAL AT BEDTIME
COMMUNITY
Start: 2019-09-24

## 2020-07-23 NOTE — PERIOP NOTES
PAT - SURGICAL PRE-ADMISSION INSTRUCTIONS    NAME:  Narayan Quiñonez                                                          TODAY'S DATE:  7/23/2020    SURGERY DATE:  7/24/2020                                  SURGERY ARRIVAL TIME:   0530 TBV    1. Do NOT eat or drink anything, including candy or gum, after MIDNIGHT on 7/23/20 , unless you have specific instructions from your Surgeon or Anesthesia Provider to do so. 2. No smoking on the day of surgery. 3. No alcohol 24 hours prior to the day of surgery. 4. No recreational drugs for one week prior to the day of surgery. 5. Leave all valuables, including money/purse, at home. 6. Remove all jewelry, nail polish, makeup (including mascara); no lotions, powders, deodorant, or perfume/cologne/after shave. 7. Glasses/Contact lenses and Dentures may be worn to the hospital.  They will be removed prior to surgery. 8. Call your doctor if symptoms of a cold or illness develop within 24 ours prior to surgery. 9. AN ADULT MUST DRIVE YOU HOME AFTER OUTPATIENT SURGERY. 10. If you are having an OUTPATIENT procedure, please make arrangements for a responsible adult to be with you for 24 hours after your surgery. 11. If you are admitted to the hospital, you will be assigned to a bed after surgery is complete. Normally a family member will not be able to see you until you are in your assigned bed. 15. Family is encouraged to accompany you to the hospital.  We ask visitors in the treatment area to be limited to ONE person at a time to ensure patient privacy. EXCEPTIONS WILL BE MADE AS NEEDED. 15. Children under 12 are discouraged from entering the treatment area and need to be supervised by an adult when in the waiting room. Special Instructions: Take these medications the morning of surgery with a sip of water:  METOPROLOL, LEXAPRO    Patient Prep:    use CHG solution    These surgical instructions were reviewed with PATIENT during the PAT PHONE CALL. Directions: On the morning of surgery, please go to the MAIN ENTRANCE. Sign in at the Registration Desk.     If you have any questions and/or concerns, please do not hesitate to call:  (Prior to the day of surgery)  [facility] unit:  [phone]  (Day of surgery)  [facility] unit:  [phone]

## 2020-07-24 ENCOUNTER — HOSPITAL ENCOUNTER (INPATIENT)
Age: 60
LOS: 5 days | Discharge: HOME | DRG: 454 | End: 2020-07-29
Attending: ORTHOPAEDIC SURGERY | Admitting: ORTHOPAEDIC SURGERY
Payer: MEDICARE

## 2020-07-24 ENCOUNTER — ANESTHESIA (OUTPATIENT)
Dept: SURGERY | Age: 60
DRG: 454 | End: 2020-07-24
Payer: MEDICARE

## 2020-07-24 ENCOUNTER — APPOINTMENT (OUTPATIENT)
Dept: GENERAL RADIOLOGY | Age: 60
DRG: 454 | End: 2020-07-24
Attending: ORTHOPAEDIC SURGERY
Payer: MEDICARE

## 2020-07-24 PROBLEM — M51.26 HNP (HERNIATED NUCLEUS PULPOSUS), LUMBAR: Status: ACTIVE | Noted: 2020-07-24

## 2020-07-24 LAB
ABO + RH BLD: NORMAL
ANION GAP SERPL CALC-SCNC: 5 MMOL/L (ref 3–18)
BASOPHILS # BLD: 0 K/UL (ref 0–0.1)
BASOPHILS NFR BLD: 0 % (ref 0–2)
BLOOD GROUP ANTIBODIES SERPL: NORMAL
BUN SERPL-MCNC: 15 MG/DL (ref 7–18)
BUN/CREAT SERPL: 15 (ref 12–20)
CALCIUM SERPL-MCNC: 8 MG/DL (ref 8.5–10.1)
CHLORIDE SERPL-SCNC: 111 MMOL/L (ref 100–111)
CO2 SERPL-SCNC: 27 MMOL/L (ref 21–32)
CREAT SERPL-MCNC: 0.97 MG/DL (ref 0.6–1.3)
DIFFERENTIAL METHOD BLD: ABNORMAL
EOSINOPHIL # BLD: 0 K/UL (ref 0–0.4)
EOSINOPHIL NFR BLD: 0 % (ref 0–5)
ERYTHROCYTE [DISTWIDTH] IN BLOOD BY AUTOMATED COUNT: 13.9 % (ref 11.6–14.5)
GLUCOSE SERPL-MCNC: 128 MG/DL (ref 74–99)
HCT VFR BLD AUTO: 38.7 % (ref 35–45)
HGB BLD-MCNC: 12.5 G/DL (ref 12–16)
LYMPHOCYTES # BLD: 0.8 K/UL (ref 0.9–3.6)
LYMPHOCYTES NFR BLD: 5 % (ref 21–52)
MCH RBC QN AUTO: 30.6 PG (ref 24–34)
MCHC RBC AUTO-ENTMCNC: 32.3 G/DL (ref 31–37)
MCV RBC AUTO: 94.9 FL (ref 74–97)
MONOCYTES # BLD: 0.4 K/UL (ref 0.05–1.2)
MONOCYTES NFR BLD: 2 % (ref 3–10)
NEUTS SEG # BLD: 14.1 K/UL (ref 1.8–8)
NEUTS SEG NFR BLD: 93 % (ref 40–73)
PLATELET # BLD AUTO: 233 K/UL (ref 135–420)
PMV BLD AUTO: 10.2 FL (ref 9.2–11.8)
POTASSIUM SERPL-SCNC: 4.5 MMOL/L (ref 3.5–5.5)
RBC # BLD AUTO: 4.08 M/UL (ref 4.2–5.3)
SODIUM SERPL-SCNC: 143 MMOL/L (ref 136–145)
SPECIMEN EXP DATE BLD: NORMAL
WBC # BLD AUTO: 15.2 K/UL (ref 4.6–13.2)

## 2020-07-24 PROCEDURE — 85025 COMPLETE CBC W/AUTO DIFF WBC: CPT

## 2020-07-24 PROCEDURE — 86900 BLOOD TYPING SEROLOGIC ABO: CPT

## 2020-07-24 PROCEDURE — 77030037087 HC DEV ART BMC PRP CLNG -H: Performed by: ORTHOPAEDIC SURGERY

## 2020-07-24 PROCEDURE — 72100 X-RAY EXAM L-S SPINE 2/3 VWS: CPT

## 2020-07-24 PROCEDURE — 74011250637 HC RX REV CODE- 250/637: Performed by: INTERNAL MEDICINE

## 2020-07-24 PROCEDURE — 77030040413: Performed by: ORTHOPAEDIC SURGERY

## 2020-07-24 PROCEDURE — 01NB0ZZ RELEASE LUMBAR NERVE, OPEN APPROACH: ICD-10-PCS | Performed by: ORTHOPAEDIC SURGERY

## 2020-07-24 PROCEDURE — 0QP004Z REMOVAL OF INTERNAL FIXATION DEVICE FROM LUMBAR VERTEBRA, OPEN APPROACH: ICD-10-PCS | Performed by: ORTHOPAEDIC SURGERY

## 2020-07-24 PROCEDURE — 80048 BASIC METABOLIC PNL TOTAL CA: CPT

## 2020-07-24 PROCEDURE — 77030032490 HC SLV COMPR SCD KNE COVD -B: Performed by: ORTHOPAEDIC SURGERY

## 2020-07-24 PROCEDURE — 76060000038 HC ANESTHESIA 3.5 TO 4 HR: Performed by: ORTHOPAEDIC SURGERY

## 2020-07-24 PROCEDURE — 77030004391 HC BUR FLUT MEDT -C: Performed by: ORTHOPAEDIC SURGERY

## 2020-07-24 PROCEDURE — 74011000272 HC RX REV CODE- 272: Performed by: ORTHOPAEDIC SURGERY

## 2020-07-24 PROCEDURE — 74011000250 HC RX REV CODE- 250: Performed by: ORTHOPAEDIC SURGERY

## 2020-07-24 PROCEDURE — 77030018836 HC SOL IRR NACL ICUM -A: Performed by: ORTHOPAEDIC SURGERY

## 2020-07-24 PROCEDURE — 77030031359 HC BLD BN MILL DISP STRY -E: Performed by: ORTHOPAEDIC SURGERY

## 2020-07-24 PROCEDURE — 74011250636 HC RX REV CODE- 250/636: Performed by: ORTHOPAEDIC SURGERY

## 2020-07-24 PROCEDURE — 74011250637 HC RX REV CODE- 250/637: Performed by: ORTHOPAEDIC SURGERY

## 2020-07-24 PROCEDURE — C1713 ANCHOR/SCREW BN/BN,TIS/BN: HCPCS | Performed by: ORTHOPAEDIC SURGERY

## 2020-07-24 PROCEDURE — 0SG3071 FUSION OF LUMBOSACRAL JOINT WITH AUTOLOGOUS TISSUE SUBSTITUTE, POSTERIOR APPROACH, POSTERIOR COLUMN, OPEN APPROACH: ICD-10-PCS | Performed by: ORTHOPAEDIC SURGERY

## 2020-07-24 PROCEDURE — 77030037723 HC GRFT BN SUB BGNX -F: Performed by: ORTHOPAEDIC SURGERY

## 2020-07-24 PROCEDURE — 77030034850: Performed by: ORTHOPAEDIC SURGERY

## 2020-07-24 PROCEDURE — 0SG1071 FUSION OF 2 OR MORE LUMBAR VERTEBRAL JOINTS WITH AUTOLOGOUS TISSUE SUBSTITUTE, POSTERIOR APPROACH, POSTERIOR COLUMN, OPEN APPROACH: ICD-10-PCS | Performed by: ORTHOPAEDIC SURGERY

## 2020-07-24 PROCEDURE — 77030003028 HC SUT VCRL J&J -A: Performed by: ORTHOPAEDIC SURGERY

## 2020-07-24 PROCEDURE — 76010000174 HC OR TIME 3.5 TO 4 HR INTENSV-TIER 1: Performed by: ORTHOPAEDIC SURGERY

## 2020-07-24 PROCEDURE — 00NY0ZZ RELEASE LUMBAR SPINAL CORD, OPEN APPROACH: ICD-10-PCS | Performed by: ORTHOPAEDIC SURGERY

## 2020-07-24 PROCEDURE — 76210000017 HC OR PH I REC 1.5 TO 2 HR: Performed by: ORTHOPAEDIC SURGERY

## 2020-07-24 PROCEDURE — 77030018723 HC ELCTRD BLD COVD -A: Performed by: ORTHOPAEDIC SURGERY

## 2020-07-24 PROCEDURE — 74011000258 HC RX REV CODE- 258: Performed by: ORTHOPAEDIC SURGERY

## 2020-07-24 PROCEDURE — 74011250636 HC RX REV CODE- 250/636: Performed by: NURSE ANESTHETIST, CERTIFIED REGISTERED

## 2020-07-24 PROCEDURE — 77030011264 HC ELECTRD BLD EXT COVD -A: Performed by: ORTHOPAEDIC SURGERY

## 2020-07-24 PROCEDURE — 0ST20ZZ RESECTION OF LUMBAR VERTEBRAL DISC, OPEN APPROACH: ICD-10-PCS | Performed by: ORTHOPAEDIC SURGERY

## 2020-07-24 PROCEDURE — 74011000258 HC RX REV CODE- 258: Performed by: INTERNAL MEDICINE

## 2020-07-24 PROCEDURE — 77030031139 HC SUT VCRL2 J&J -A: Performed by: ORTHOPAEDIC SURGERY

## 2020-07-24 PROCEDURE — 74011000250 HC RX REV CODE- 250: Performed by: NURSE ANESTHETIST, CERTIFIED REGISTERED

## 2020-07-24 PROCEDURE — 77030040356 HC CORD BPLR FRCP COVD -A: Performed by: ORTHOPAEDIC SURGERY

## 2020-07-24 PROCEDURE — 77030028270 HC SRGFL HEMSTAT MTRX J&J -C: Performed by: ORTHOPAEDIC SURGERY

## 2020-07-24 PROCEDURE — 77030028593 HC SPCR SPN ZYSTN ZIMM -I1: Performed by: ORTHOPAEDIC SURGERY

## 2020-07-24 PROCEDURE — 0SG00AJ FUSION OF LUMBAR VERTEBRAL JOINT WITH INTERBODY FUSION DEVICE, POSTERIOR APPROACH, ANTERIOR COLUMN, OPEN APPROACH: ICD-10-PCS | Performed by: ORTHOPAEDIC SURGERY

## 2020-07-24 PROCEDURE — 65270000029 HC RM PRIVATE

## 2020-07-24 PROCEDURE — 30233N0 TRANSFUSION OF AUTOLOGOUS RED BLOOD CELLS INTO PERIPHERAL VEIN, PERCUTANEOUS APPROACH: ICD-10-PCS | Performed by: ORTHOPAEDIC SURGERY

## 2020-07-24 DEVICE — GRAFT BNE 6ML SUB OSTEOSPAN MOLD OPN BRL SYR MORPHEUS: Type: IMPLANTABLE DEVICE | Site: SPINE LUMBAR | Status: FUNCTIONAL

## 2020-07-24 DEVICE — SCREW SPNL L50MM OD65MM POLYAX ASSEMB CANAVERAL: Type: IMPLANTABLE DEVICE | Site: SPINE LUMBAR | Status: FUNCTIONAL

## 2020-07-24 DEVICE — IMPLANTABLE DEVICE
Type: IMPLANTABLE DEVICE | Site: SPINE LUMBAR | Status: FUNCTIONAL
Brand: ZYSTON CURVE SPACER SYS

## 2020-07-24 RX ORDER — CEFAZOLIN SODIUM 2 G/50ML
2 SOLUTION INTRAVENOUS
Status: COMPLETED | OUTPATIENT
Start: 2020-07-24 | End: 2020-07-24

## 2020-07-24 RX ORDER — DEXAMETHASONE SODIUM PHOSPHATE 4 MG/ML
INJECTION, SOLUTION INTRA-ARTICULAR; INTRALESIONAL; INTRAMUSCULAR; INTRAVENOUS; SOFT TISSUE AS NEEDED
Status: DISCONTINUED | OUTPATIENT
Start: 2020-07-24 | End: 2020-07-24 | Stop reason: HOSPADM

## 2020-07-24 RX ORDER — ESCITALOPRAM OXALATE 20 MG/1
20 TABLET ORAL DAILY
Status: DISCONTINUED | OUTPATIENT
Start: 2020-07-25 | End: 2020-07-29 | Stop reason: HOSPADM

## 2020-07-24 RX ORDER — NALOXONE HYDROCHLORIDE 0.4 MG/ML
0.1 INJECTION, SOLUTION INTRAMUSCULAR; INTRAVENOUS; SUBCUTANEOUS AS NEEDED
Status: DISCONTINUED | OUTPATIENT
Start: 2020-07-24 | End: 2020-07-24 | Stop reason: HOSPADM

## 2020-07-24 RX ORDER — ATORVASTATIN CALCIUM 10 MG/1
10 TABLET, FILM COATED ORAL
Status: DISCONTINUED | OUTPATIENT
Start: 2020-07-24 | End: 2020-07-29 | Stop reason: HOSPADM

## 2020-07-24 RX ORDER — FLUMAZENIL 0.1 MG/ML
0.2 INJECTION INTRAVENOUS
Status: DISCONTINUED | OUTPATIENT
Start: 2020-07-24 | End: 2020-07-24

## 2020-07-24 RX ORDER — DIAZEPAM 5 MG/1
5 TABLET ORAL
Status: DISCONTINUED | OUTPATIENT
Start: 2020-07-24 | End: 2020-07-26

## 2020-07-24 RX ORDER — PANTOPRAZOLE SODIUM 40 MG/1
40 TABLET, DELAYED RELEASE ORAL
Status: DISCONTINUED | OUTPATIENT
Start: 2020-07-25 | End: 2020-07-29 | Stop reason: HOSPADM

## 2020-07-24 RX ORDER — HYDROMORPHONE HYDROCHLORIDE 1 MG/ML
2 INJECTION, SOLUTION INTRAMUSCULAR; INTRAVENOUS; SUBCUTANEOUS
Status: DISCONTINUED | OUTPATIENT
Start: 2020-07-24 | End: 2020-07-25

## 2020-07-24 RX ORDER — PROPOFOL 10 MG/ML
INJECTION, EMULSION INTRAVENOUS AS NEEDED
Status: DISCONTINUED | OUTPATIENT
Start: 2020-07-24 | End: 2020-07-24 | Stop reason: HOSPADM

## 2020-07-24 RX ORDER — ONDANSETRON 2 MG/ML
4 INJECTION INTRAMUSCULAR; INTRAVENOUS ONCE
Status: DISCONTINUED | OUTPATIENT
Start: 2020-07-24 | End: 2020-07-24

## 2020-07-24 RX ORDER — SODIUM CHLORIDE 0.9 % (FLUSH) 0.9 %
5-40 SYRINGE (ML) INJECTION EVERY 8 HOURS
Status: DISCONTINUED | OUTPATIENT
Start: 2020-07-24 | End: 2020-07-24 | Stop reason: HOSPADM

## 2020-07-24 RX ORDER — LIDOCAINE HYDROCHLORIDE 20 MG/ML
INJECTION, SOLUTION EPIDURAL; INFILTRATION; INTRACAUDAL; PERINEURAL AS NEEDED
Status: DISCONTINUED | OUTPATIENT
Start: 2020-07-24 | End: 2020-07-24 | Stop reason: HOSPADM

## 2020-07-24 RX ORDER — ONDANSETRON 2 MG/ML
INJECTION INTRAMUSCULAR; INTRAVENOUS AS NEEDED
Status: DISCONTINUED | OUTPATIENT
Start: 2020-07-24 | End: 2020-07-24 | Stop reason: HOSPADM

## 2020-07-24 RX ORDER — GLYCOPYRROLATE 0.2 MG/ML
INJECTION INTRAMUSCULAR; INTRAVENOUS AS NEEDED
Status: DISCONTINUED | OUTPATIENT
Start: 2020-07-24 | End: 2020-07-24 | Stop reason: HOSPADM

## 2020-07-24 RX ORDER — DEXTROSE MONOHYDRATE AND SODIUM CHLORIDE 5; .9 G/100ML; G/100ML
125 INJECTION, SOLUTION INTRAVENOUS CONTINUOUS
Status: DISCONTINUED | OUTPATIENT
Start: 2020-07-24 | End: 2020-07-27

## 2020-07-24 RX ORDER — INSULIN LISPRO 100 [IU]/ML
INJECTION, SOLUTION INTRAVENOUS; SUBCUTANEOUS ONCE
Status: DISCONTINUED | OUTPATIENT
Start: 2020-07-24 | End: 2020-07-24

## 2020-07-24 RX ORDER — ACETAMINOPHEN 500 MG
500 TABLET ORAL
Status: DISCONTINUED | OUTPATIENT
Start: 2020-07-24 | End: 2020-07-29 | Stop reason: HOSPADM

## 2020-07-24 RX ORDER — ONDANSETRON 2 MG/ML
4 INJECTION INTRAMUSCULAR; INTRAVENOUS
Status: DISCONTINUED | OUTPATIENT
Start: 2020-07-24 | End: 2020-07-29 | Stop reason: HOSPADM

## 2020-07-24 RX ORDER — SODIUM CHLORIDE 0.9 % (FLUSH) 0.9 %
5-40 SYRINGE (ML) INJECTION AS NEEDED
Status: DISCONTINUED | OUTPATIENT
Start: 2020-07-24 | End: 2020-07-29 | Stop reason: HOSPADM

## 2020-07-24 RX ORDER — SODIUM CHLORIDE 0.9 % (FLUSH) 0.9 %
5-40 SYRINGE (ML) INJECTION EVERY 8 HOURS
Status: DISCONTINUED | OUTPATIENT
Start: 2020-07-24 | End: 2020-07-29 | Stop reason: HOSPADM

## 2020-07-24 RX ORDER — VANCOMYCIN HYDROCHLORIDE 1 G/20ML
INJECTION, POWDER, LYOPHILIZED, FOR SOLUTION INTRAVENOUS AS NEEDED
Status: DISCONTINUED | OUTPATIENT
Start: 2020-07-24 | End: 2020-07-24 | Stop reason: HOSPADM

## 2020-07-24 RX ORDER — SODIUM CHLORIDE, SODIUM LACTATE, POTASSIUM CHLORIDE, CALCIUM CHLORIDE 600; 310; 30; 20 MG/100ML; MG/100ML; MG/100ML; MG/100ML
100 INJECTION, SOLUTION INTRAVENOUS CONTINUOUS
Status: DISCONTINUED | OUTPATIENT
Start: 2020-07-24 | End: 2020-07-24

## 2020-07-24 RX ORDER — OXYCODONE AND ACETAMINOPHEN 10; 325 MG/1; MG/1
1-2 TABLET ORAL
Status: DISCONTINUED | OUTPATIENT
Start: 2020-07-24 | End: 2020-07-29 | Stop reason: HOSPADM

## 2020-07-24 RX ORDER — SODIUM CHLORIDE 0.9 % (FLUSH) 0.9 %
5-40 SYRINGE (ML) INJECTION AS NEEDED
Status: DISCONTINUED | OUTPATIENT
Start: 2020-07-24 | End: 2020-07-24 | Stop reason: HOSPADM

## 2020-07-24 RX ORDER — HYDROMORPHONE HYDROCHLORIDE 1 MG/ML
0.5 INJECTION, SOLUTION INTRAMUSCULAR; INTRAVENOUS; SUBCUTANEOUS
Status: DISCONTINUED | OUTPATIENT
Start: 2020-07-24 | End: 2020-07-24

## 2020-07-24 RX ORDER — MIDAZOLAM HYDROCHLORIDE 1 MG/ML
INJECTION, SOLUTION INTRAMUSCULAR; INTRAVENOUS AS NEEDED
Status: DISCONTINUED | OUTPATIENT
Start: 2020-07-24 | End: 2020-07-24 | Stop reason: HOSPADM

## 2020-07-24 RX ORDER — CEFAZOLIN SODIUM 2 G/50ML
2 SOLUTION INTRAVENOUS EVERY 8 HOURS
Status: COMPLETED | OUTPATIENT
Start: 2020-07-24 | End: 2020-07-25

## 2020-07-24 RX ORDER — OXYCODONE AND ACETAMINOPHEN 10; 325 MG/1; MG/1
2 TABLET ORAL
Status: DISCONTINUED | OUTPATIENT
Start: 2020-07-24 | End: 2020-07-24

## 2020-07-24 RX ORDER — FAMOTIDINE 20 MG/1
20 TABLET, FILM COATED ORAL ONCE
Status: DISCONTINUED | OUTPATIENT
Start: 2020-07-24 | End: 2020-07-24 | Stop reason: HOSPADM

## 2020-07-24 RX ORDER — MAG HYDROX/ALUMINUM HYD/SIMETH 200-200-20
30 SUSPENSION, ORAL (FINAL DOSE FORM) ORAL
Status: DISCONTINUED | OUTPATIENT
Start: 2020-07-24 | End: 2020-07-29 | Stop reason: HOSPADM

## 2020-07-24 RX ORDER — DIAZEPAM 10 MG/2ML
5 INJECTION INTRAMUSCULAR
Status: DISCONTINUED | OUTPATIENT
Start: 2020-07-24 | End: 2020-07-24

## 2020-07-24 RX ORDER — EPHEDRINE SULFATE/0.9% NACL/PF 50 MG/5 ML
SYRINGE (ML) INTRAVENOUS AS NEEDED
Status: DISCONTINUED | OUTPATIENT
Start: 2020-07-24 | End: 2020-07-24 | Stop reason: HOSPADM

## 2020-07-24 RX ORDER — DEXTROSE, SODIUM CHLORIDE, AND POTASSIUM CHLORIDE 5; .45; .15 G/100ML; G/100ML; G/100ML
125 INJECTION INTRAVENOUS CONTINUOUS
Status: DISCONTINUED | OUTPATIENT
Start: 2020-07-24 | End: 2020-07-24

## 2020-07-24 RX ORDER — FAMOTIDINE 20 MG/1
20 TABLET, FILM COATED ORAL ONCE
Status: DISCONTINUED | OUTPATIENT
Start: 2020-07-25 | End: 2020-07-24

## 2020-07-24 RX ORDER — MAGNESIUM SULFATE 100 %
4 CRYSTALS MISCELLANEOUS AS NEEDED
Status: DISCONTINUED | OUTPATIENT
Start: 2020-07-24 | End: 2020-07-24

## 2020-07-24 RX ORDER — METOPROLOL SUCCINATE 50 MG/1
50 TABLET, EXTENDED RELEASE ORAL DAILY
Status: DISCONTINUED | OUTPATIENT
Start: 2020-07-25 | End: 2020-07-26

## 2020-07-24 RX ORDER — SODIUM CHLORIDE, SODIUM LACTATE, POTASSIUM CHLORIDE, CALCIUM CHLORIDE 600; 310; 30; 20 MG/100ML; MG/100ML; MG/100ML; MG/100ML
50 INJECTION, SOLUTION INTRAVENOUS CONTINUOUS
Status: DISCONTINUED | OUTPATIENT
Start: 2020-07-24 | End: 2020-07-24 | Stop reason: HOSPADM

## 2020-07-24 RX ORDER — DIPHENHYDRAMINE HCL 25 MG
25 CAPSULE ORAL
Status: DISCONTINUED | OUTPATIENT
Start: 2020-07-24 | End: 2020-07-29 | Stop reason: HOSPADM

## 2020-07-24 RX ORDER — FENTANYL CITRATE 50 UG/ML
INJECTION, SOLUTION INTRAMUSCULAR; INTRAVENOUS AS NEEDED
Status: DISCONTINUED | OUTPATIENT
Start: 2020-07-24 | End: 2020-07-24 | Stop reason: HOSPADM

## 2020-07-24 RX ORDER — HYDROMORPHONE HYDROCHLORIDE 1 MG/ML
INJECTION, SOLUTION INTRAMUSCULAR; INTRAVENOUS; SUBCUTANEOUS AS NEEDED
Status: DISCONTINUED | OUTPATIENT
Start: 2020-07-24 | End: 2020-07-24 | Stop reason: HOSPADM

## 2020-07-24 RX ORDER — ROCURONIUM BROMIDE 10 MG/ML
INJECTION, SOLUTION INTRAVENOUS AS NEEDED
Status: DISCONTINUED | OUTPATIENT
Start: 2020-07-24 | End: 2020-07-24 | Stop reason: HOSPADM

## 2020-07-24 RX ADMIN — GLYCOPYRROLATE 0.4 MG: 0.2 INJECTION INTRAMUSCULAR; INTRAVENOUS at 08:38

## 2020-07-24 RX ADMIN — LIDOCAINE HYDROCHLORIDE 80 MG: 20 INJECTION, SOLUTION INTRAVENOUS at 07:47

## 2020-07-24 RX ADMIN — FENTANYL CITRATE 25 MCG: 50 INJECTION, SOLUTION INTRAMUSCULAR; INTRAVENOUS at 11:06

## 2020-07-24 RX ADMIN — DIAZEPAM 5 MG: 5 TABLET ORAL at 22:02

## 2020-07-24 RX ADMIN — HYDROMORPHONE HYDROCHLORIDE 0.5 MG: 1 INJECTION, SOLUTION INTRAMUSCULAR; INTRAVENOUS; SUBCUTANEOUS at 18:08

## 2020-07-24 RX ADMIN — CEFAZOLIN 2 G: 10 INJECTION, POWDER, FOR SOLUTION INTRAVENOUS at 14:56

## 2020-07-24 RX ADMIN — DEXTROSE MONOHYDRATE AND SODIUM CHLORIDE 125 ML/HR: 5; .9 INJECTION, SOLUTION INTRAVENOUS at 13:25

## 2020-07-24 RX ADMIN — ONDANSETRON 4 MG: 2 SOLUTION INTRAMUSCULAR; INTRAVENOUS at 08:25

## 2020-07-24 RX ADMIN — SUGAMMADEX 200 MG: 100 INJECTION, SOLUTION INTRAVENOUS at 11:01

## 2020-07-24 RX ADMIN — SODIUM CHLORIDE 10 ML: 9 INJECTION, SOLUTION INTRAMUSCULAR; INTRAVENOUS; SUBCUTANEOUS at 22:20

## 2020-07-24 RX ADMIN — ATORVASTATIN CALCIUM 10 MG: 10 TABLET, FILM COATED ORAL at 22:02

## 2020-07-24 RX ADMIN — OXYCODONE HYDROCHLORIDE AND ACETAMINOPHEN 2 TABLET: 10; 325 TABLET ORAL at 22:02

## 2020-07-24 RX ADMIN — DIAZEPAM 5 MG: 5 INJECTION, SOLUTION INTRAMUSCULAR; INTRAVENOUS at 12:07

## 2020-07-24 RX ADMIN — DEXTROSE MONOHYDRATE AND SODIUM CHLORIDE 125 ML/HR: 5; .9 INJECTION, SOLUTION INTRAVENOUS at 22:02

## 2020-07-24 RX ADMIN — FENTANYL CITRATE 25 MCG: 50 INJECTION, SOLUTION INTRAMUSCULAR; INTRAVENOUS at 11:08

## 2020-07-24 RX ADMIN — DEXAMETHASONE SODIUM PHOSPHATE 10 MG: 4 INJECTION, SOLUTION INTRA-ARTICULAR; INTRALESIONAL; INTRAMUSCULAR; INTRAVENOUS; SOFT TISSUE at 08:25

## 2020-07-24 RX ADMIN — FENTANYL CITRATE 25 MCG: 50 INJECTION, SOLUTION INTRAMUSCULAR; INTRAVENOUS at 11:02

## 2020-07-24 RX ADMIN — PROPOFOL 150 MG: 10 INJECTION, EMULSION INTRAVENOUS at 07:47

## 2020-07-24 RX ADMIN — FENTANYL CITRATE 25 MCG: 50 INJECTION, SOLUTION INTRAMUSCULAR; INTRAVENOUS at 11:04

## 2020-07-24 RX ADMIN — HYDROMORPHONE HYDROCHLORIDE 1 MG: 1 INJECTION, SOLUTION INTRAMUSCULAR; INTRAVENOUS; SUBCUTANEOUS at 08:23

## 2020-07-24 RX ADMIN — CEFAZOLIN 2 G: 10 INJECTION, POWDER, FOR SOLUTION INTRAVENOUS at 22:02

## 2020-07-24 RX ADMIN — CEFAZOLIN 2 G: 10 INJECTION, POWDER, FOR SOLUTION INTRAVENOUS at 08:00

## 2020-07-24 RX ADMIN — SODIUM CHLORIDE, SODIUM LACTATE, POTASSIUM CHLORIDE, AND CALCIUM CHLORIDE 50 ML/HR: 600; 310; 30; 20 INJECTION, SOLUTION INTRAVENOUS at 07:04

## 2020-07-24 RX ADMIN — ROCURONIUM BROMIDE 10 MG: 10 SOLUTION INTRAVENOUS at 10:41

## 2020-07-24 RX ADMIN — FENTANYL CITRATE 100 MCG: 50 INJECTION, SOLUTION INTRAMUSCULAR; INTRAVENOUS at 07:40

## 2020-07-24 RX ADMIN — OXYCODONE HYDROCHLORIDE AND ACETAMINOPHEN 2 TABLET: 10; 325 TABLET ORAL at 18:23

## 2020-07-24 RX ADMIN — ROCURONIUM BROMIDE 50 MG: 10 SOLUTION INTRAVENOUS at 07:47

## 2020-07-24 RX ADMIN — HYDROMORPHONE HYDROCHLORIDE 0.5 MG: 1 INJECTION, SOLUTION INTRAMUSCULAR; INTRAVENOUS; SUBCUTANEOUS at 12:31

## 2020-07-24 RX ADMIN — HYDROMORPHONE HYDROCHLORIDE 2 MG: 1 INJECTION, SOLUTION INTRAMUSCULAR; INTRAVENOUS; SUBCUTANEOUS at 13:46

## 2020-07-24 RX ADMIN — Medication 10 MG: at 09:52

## 2020-07-24 RX ADMIN — MIDAZOLAM 2 MG: 1 INJECTION INTRAMUSCULAR; INTRAVENOUS at 07:37

## 2020-07-24 NOTE — ANESTHESIA PREPROCEDURE EVALUATION
Relevant Problems   No relevant active problems       Anesthetic History   No history of anesthetic complications            Review of Systems / Medical History  Patient summary reviewed and pertinent labs reviewed    Pulmonary        Sleep apnea: CPAP           Neuro/Psych              Cardiovascular            Dysrhythmias : sinus tachycardia           GI/Hepatic/Renal  Within defined limits              Endo/Other        Arthritis     Other Findings              Physical Exam    Airway  Mallampati: II  TM Distance: 4 - 6 cm  Neck ROM: normal range of motion   Mouth opening: Normal     Cardiovascular    Rhythm: regular  Rate: normal         Dental    Dentition: Poor dentition     Pulmonary  Breath sounds clear to auscultation               Abdominal  GI exam deferred       Other Findings            Anesthetic Plan    ASA: 3  Anesthesia type: general          Induction: Intravenous  Anesthetic plan and risks discussed with: Patient

## 2020-07-24 NOTE — ANESTHESIA POSTPROCEDURE EVALUATION
Procedure(s):  Decompression l2-l3 right l2-l3 extend fusion to l2, posterolateral spine fusion l2-l3, transforminal interbody fusion l2-l3, and cage placement. general    Anesthesia Post Evaluation      Multimodal analgesia: multimodal analgesia used between 6 hours prior to anesthesia start to PACU discharge  Patient location during evaluation: bedside  Patient participation: complete - patient participated  Level of consciousness: awake  Pain management: adequate  Airway patency: patent  Anesthetic complications: no  Cardiovascular status: stable  Respiratory status: acceptable  Hydration status: acceptable  Post anesthesia nausea and vomiting:  controlled      INITIAL Post-op Vital signs:   Vitals Value Taken Time   /57 7/24/2020  1:12 PM   Temp 37.1 °C (98.7 °F) 7/24/2020 11:30 AM   Pulse 93 7/24/2020  1:32 PM   Resp 20 7/24/2020  1:32 PM   SpO2 96 % 7/24/2020  1:32 PM   Vitals shown include unvalidated device data.

## 2020-07-24 NOTE — CONSULTS
Reason for consultation:    Monitoring and management of  acute and chronic medical problems     Postoperative Diagnosis: m48.061       Procedure: Procedure(s):  Decompression l2-l3 right l2-l3 extend fusion to l2, posterolateral spine fusion l2-l3, transforminal interbody fusion l2-l3, and cage placement      Replaced 250 mcs      [id number]        [id number]      HPI: Seen in PACU  Pleasant lady post uneventful L/S decompression and fusion as outlined above  Expected post op pain controlled; some muscle spasms; no LEs pain, numbness or weakness  No CP SOB  No N/V  Rodriguez in place; good [address] PROBLEMS/PMH/PSH    Patient Active Problem List   Diagnosis Code    Lumbar stenosis M48.061    HNP (herniated nucleus pulposus), lumbar M51.26     Paroxysmal atrial tachycardia   Palpitations  Premature ventricular contractions   GERD  Dpression     Echocardiogram 01/17/2017:  NORMAL GLOBAL LEFT VENTRICULAR SYSTOLIC FUNCTION WITH AN ESTIMATED EJECTION FRACTION OF  65%. NORMAL DIASTOLIC FUNCTION. NORMAL CHAMBER SIZES. MILD DIFFUSE THICKENING (SCLEROSIS) OF THE AORTIC VALVE CUSPS WITHOUT REDUCED EXCURSION. NO HEMODYNAMICALLY SIGNIFICANT VALVULAR PATHOLOGY. NORMAL PULMONARY ARTERY PRESSURE OF 26 MMHG. NO PREVIOUS REPORT FOR COMPARISON.       Cardiac Event Monitor 06/18/19- report not available    PMH:  Past Medical History:   Diagnosis Date    Adverse effect of anesthesia     Pt.  awakens screaming and flailing    Hypercholesteremia     Paroxysmal atrioventricular tachycardia (HCC)     Sleep apnea     C PAP     OU cataracts    PSH:  Post lumbar spine decompression and fusion for lumbar spine stenosis and spondylosis 04/05/18    Past Surgical History:   Procedure Laterality Date    HX HYSTERECTOMY      HX LUMBAR DISKECTOMY      HX LUMBAR DISKECTOMY      HX LUMBAR FUSION      HX ORTHOPAEDIC      Ulnar nrve revision    HX ORTHOPAEDIC      Brachial nerve revision    HX ORTHOPAEDIC Right trigger finer release       PTA MEDICATIONS  Medications Prior to Admission   Medication Sig    atorvastatin (LIPITOR) 10 mg tablet Take 10 mg by mouth At bedtime.  metoprolol succinate (TOPROL-XL) 50 mg XL tablet Take 50 mg by mouth daily.  escitalopram oxalate (LEXAPRO) 20 mg tablet Take 20 mg by mouth daily. ALLERGIES:    Allergies   Allergen Reactions    Shellfish Derived Anaphylaxis        FAMILY HISTORY   Father:  dementia 79 y/o  Mother: living 80 [facility]  Brothers: one One Arch Bari  Sisters : two One Arch Bari  Children: one     History reviewed. No pertinent family history.     SOCIAL HISTORY  Marital Status:  lives with spouse  Education Completed:  Occupation: NP  Tobacco use: never  Alcohol use: no  Drug use: no  STI: no  Living will: no  POA: no    Social History     Socioeconomic History    Marital status:      Spouse name: Not on file    Number of children: Not on file    Years of education: Not on file    Highest education level: Not on file   Occupational History    Not on file   Social Needs    Financial resource strain: Not on file    Food insecurity     Worry: Not on file     Inability: Not on file   [employer] needs     Medical: Not on file     Non-medical: Not on file   Tobacco Use    Smoking status: Never Smoker    Smokeless tobacco: Never Used   Substance and Sexual Activity    Alcohol use: No    Drug use: No    Sexual activity: Not on file   Lifestyle    Physical activity     Days per week: Not on file     Minutes per session: Not on file    Stress: Not on file   Relationships    Social connections     Talks on phone: Not on file     Gets together: Not on file     Attends [employer] service: Not on file     Active member of club or organization: Not on file     Attends meetings of clubs or organizations: Not on file     Relationship status: Not on file    Intimate partner violence     Fear of current or ex partner: Not on file     Emotionally abused: Not on file     Physically abused: Not on file     Forced sexual activity: Not on file   Other Topics Concern    Not on file   Social History Narrative    Not on file       ROS:  Constitutional: No headache  Eyes: No visual changes  Ears:  No hearing loss. Nose: No congestion. No bleed  Neck: No pain. Cardiac: No CP. TABOR. No orthopnea. No PND. No leg edema . ++ palpitation  Respiratory: No cough . No SOB. No wheezing  GI:. No nausea . No vomiting. No reflux. No abdominal pain. Normal BM . No black stool. No blood in the stool. : No urgency frequency or incontinence  M/S: ++Chronic back pain and muscle spasms  Neuro: PTA BLE weakness and numbness Rt>LT. Skin: No rash. No itching  HEM/LYMPH: No enlarged LN. No bruising. Endocrine: No polydipsia. No polyuria. No cold intolerance  Psych:  No anxiety . chronic depression. No sleep difficulty     Physical Exam:      Visit Vitals  /58   Pulse 68   Temp 97.5 °F (36.4 °C)   Resp 20   Ht 5' 3\" (1.6 m)   Wt 83.6 kg (184 lb 4 oz)   SpO2 95%   BMI 32.64 kg/m²       GENERAL: NAD awake coherent  HEENT:    Face:Symmetrical.    CONJUNCTIVA: pink    SCLERA: Anicteric    ORAL MUCOSA: Moist. No lesions  TONGUE: Moist; No lesions  TEETH:  No broken or loose tooth    GUMS: No bleeding or swelling  HARD SOFT/ PALATE/TONSILS: No Swelling. No ulcers. No Masses  OROPHARYNX: WNL  NECK: No JVD. No masses. No enlarged-tender lymph nodes.  Trachea in mid line.  Thyroid size normal. No palpable thyroid nodules    CAROTID ARTERIES: Pulsation 2+ bilaterally. No bruits  LUNGS: CTA. BS Normal Bilaterally  HEART: RRR   Normal S1 S2. No Significant murmur. No S3 S4  ABDOMEN: Soft. Normal BS. No tenderness. No HSM /SMG. No palpable enlarged aorta. No bruits. LE: No edema. SCD in place  PULSES: Radial 2+; Femoral 2+; PT 2+    SKIN: No Rash. No Bruising.     MUSCULOSKELETAL:      Adequate movement in all extremeties    NEUROLOGIC:     Oriented X 4  Muscle Strength  RLE: Quads AT GCS 5/5  LLE: Quads AT GCS 5/5     Reflexes: not examined     PSYCHIATRIC            Not Depressed  Anxious                                            Labs Reviewed:    Recent Results (from the past 24 hour(s))   TYPE & SCREEN    Collection Time: 07/24/20  6:52 AM   Result Value Ref Range    Crossmatch Expiration 07/27/2020     ABO/Rh(D) A POSITIVE     Antibody screen NEG    CBC WITH AUTOMATED DIFF    Collection Time: 07/24/20  1:30 PM   Result Value Ref Range    WBC 15.2 (H) 4.6 - 13.2 K/uL    RBC 4.08 (L) 4.20 - 5.30 M/uL    HGB 12.5 12.0 - 16.0 g/dL    HCT 38.7 35.0 - 45.0 %    MCV 94.9 74.0 - 97.0 FL    MCH 30.6 24.0 - 34.0 PG    MCHC 32.3 31.0 - 37.0 g/dL    RDW 13.9 11.6 - 14.5 %    PLATELET 259 506 - 841 K/uL    MPV 10.2 9.2 - 11.8 FL    NEUTROPHILS 93 (H) 40 - 73 %    LYMPHOCYTES 5 (L) 21 - 52 %    MONOCYTES 2 (L) 3 - 10 %    EOSINOPHILS 0 0 - 5 %    BASOPHILS 0 0 - 2 %    ABS. NEUTROPHILS 14.1 (H) 1.8 - 8.0 K/UL    ABS. LYMPHOCYTES 0.8 (L) 0.9 - 3.6 K/UL    ABS. MONOCYTES 0.4 0.05 - 1.2 K/UL    ABS. EOSINOPHILS 0.0 0.0 - 0.4 K/UL    ABS.  BASOPHILS 0.0 0.0 - 0.1 K/UL    DF AUTOMATED     METABOLIC PANEL, BASIC    Collection Time: 07/24/20  1:30 PM   Result Value Ref Range    Sodium 143 136 - 145 mmol/L    Potassium 4.5 3.5 - 5.5 mmol/L    Chloride 111 100 - 111 mmol/L    CO2 27 21 - 32 mmol/L    Anion gap 5 3.0 - 18 mmol/L    Glucose 128 (H) 74 - 99 mg/dL    BUN 15 7.0 - 18 MG/DL    Creatinine 0.97 0.6 - 1.3 MG/DL    BUN/Creatinine ratio 15 12 - 20      GFR est AA >60 >60 ml/min/1.73m2    GFR est non-AA 59 (L) >60 ml/min/1.73m2    Calcium 8.0 (L) 8.5 - 10.1 MG/DL       ASSESSMENT  Post uneventful L/S decompression and fusion-stable  Post op pain  Post op muscle spasms  PAT  GERD   Obesity  Leukocytosis; no fever    PLAN  Resume PTA meds as conditions allow  Valium  Percocet  Zofran  IVF  Rodriguez out in AM  [address] Blayne MD  7/24/2020, 6:54 PM    CELL [phone]

## 2020-07-24 NOTE — OP NOTES
BRIEF OPERATIVE NOTE    Date of Procedure: 7/24/2020     Preoperative Diagnosis: m48.061    Postoperative Diagnosis: m48.061      Procedure: Procedure(s):  Decompression l2-l3 right l2-l3 extend fusion to l2, posterolateral spine fusion l2-l3, transforminal interbody fusion l2-l3, and cage placement    Surgeon(s) and Role: Deacon Estrada MD - Primary    Anesthesia: General     Findings: extruded r central L2-3 hnp. Fusion l3-s1 solid     Estimated Blood Loss: 600  [id number] mcs      [id number]        [id number]    Specimens: * No specimens in log *     Tubes/Drains: None    Needle/sponge count:  Correct    Complications: 0  In RR pt alert and notes decreased R anterior thigh pain. Quads, AT, GS intact Sheila Cortés to follow  Up at [facility]  PT ambulate with tlso  Home 2-3 days with tlso.   I excribed percocet to her pharmacy  rto 1 month

## 2020-07-24 NOTE — PERIOP NOTES
TRANSFER - OUT REPORT:    Verbal report given to [facility] rn(name) on Shekhar Sellers  being transferred to 2400(unit) for routine post - op       Report consisted of patients Situation, Background, Assessment and   Recommendations(SBAR). Information from the following report(s) SBAR, OR Summary, Intake/Output and MAR was reviewed with the receiving nurse. Lines:   Peripheral IV 07/24/20 Left Hand (Active)   Site Assessment Clean, dry, & intact 07/24/20 1127   Phlebitis Assessment 0 07/24/20 1127   Infiltration Assessment 0 07/24/20 1127   Dressing Status Clean, dry, & intact 07/24/20 1127   Dressing Type Tape;Transparent 07/24/20 1127   Hub Color/Line Status Pink; Infusing 07/24/20 1127   Action Taken Open ports on tubing capped 07/24/20 1127   Alcohol Cap Used Yes 07/24/20 1127       Peripheral IV 07/24/20 Right;Posterior Hand (Active)   Site Assessment Clean, dry, & intact 07/24/20 1127   Phlebitis Assessment 0 07/24/20 1127   Infiltration Assessment 0 07/24/20 1127   Dressing Status Clean, dry, & intact 07/24/20 1127   Dressing Type Tape;Transparent 07/24/20 1127   Hub Color/Line Status Pink;Capped 07/24/20 1127        Opportunity for questions and clarification was provided. Patient transported with nurse  2000 patient transferred to 2407 in good condition ,family updated on status.

## 2020-07-24 NOTE — PERIOP NOTES
Pre-Op Summary    Pt arrived via car with family/friend and is oriented to time, place, person and situation. Patient with steady gait with none assistive devices. Visit Vitals  /66 (BP 1 Location: Right arm, BP Patient Position: Supine)   Pulse (!) 57   Temp 98.2 °F (36.8 °C)   Resp 16   Ht 5' 3\" (1.6 m)   Wt 83.6 kg (184 lb 4 oz)   SpO2 95%   BMI 32.64 kg/m²       Peripheral IV located on Left hand . Patients belongings are located locked in store room. Patient's point of contact is Aashish Best, husbandand their contact number is: [phone]. They will be called for . They are able to receive medication information. They will be admitted.

## 2020-07-24 NOTE — H&P
Day of Surgery Update:  Narayan Quiñonez was seen and examined. History and physical has been reviewed. The patient has been examined.  There have been no significant clinical changes since the completion of the originally dated History and Physical.    Signed By: Kay Aponte MD     July 24, 2020 7:30 AM

## 2020-07-24 NOTE — OP NOTES
[facility]  OPERATIVE REPORT    Name:  Kianna White  MR#:   [id number]  :  1960  ACCOUNT #:  [de-identified]  DATE OF SERVICE:  2020    PREOPERATIVE DIAGNOSES:  Right central disk herniation, L2-3 with stenosis, status post decompression and fusion with FloSpine pedicle instrumentation, L3-S1. POSTOPERATIVE DIAGNOSES:  Right central disk herniation, L2-3 with stenosis, status post decompression and fusion with FloSpine pedicle instrumentation, L3-S1. PROCEDURE PERFORMED:  1. Removal of FloSpine pedicle instrumentation, L3-S1 and exploration of fusion. 2.  Lumbar decompression, L2-3 with bilateral exploration and decompression of the L2 and L3 nerve roots and foraminotomy and partial facetectomy. 3.  Excision, right central L2-3 disk herniation. 4.  FloSpine pedicle instrumentation, L2-S1.  5.  Bilateral posterolateral spinal fusion, L2-S1 with local bone graft, bone bank bone graft and autologous growth factor. 6.  Transforaminal lumbar interbody fusion, L2-3 with local bone graft, bone bank bone graft and autologous growth factor. 7.  Placement of 9-mm Zyston cage filled with bone graft material, L2-3. SURGEON:  JOSE Vazquez MD.    ASSISTANTLonny Cisneros CSA. ANESTHESIA:  General.    COMPLICATIONS:  None. SPECIMENS REMOVED:  None. IMPLANTS:  000. ESTIMATED BLOOD LOSS:  600 mL. FINDINGS:  1. The patient's instrumentation was intact and the fusion solid, L3-S1.  2.  The patient had an extruded right central L2-3 disk herniation with fragments lying through a hole in the annulus contained by the posterior longitudinal ligament producing significant right central stenosis. There was a hole in the posterior longitudinal ligament ventral to the right L3 nerve root. PROCEDURE:  Under general anesthesia, the patient rolled in prone position on a Moncho table.   Hips extended, knees flexed, peripheral nerves padded, back prepped and draped in sterile fashion. Previous incision was opened, carried proximally to tip of the L1 lamina. Muscles subperiosteally exposed, laterally to the L2 transverse process and the instrumentation distally. Set screws removed. Rods removed bilaterally.  used bilaterally of each level to check fusion and the fusion was solid. The L3 screws were removed to allow working distance. Border of previous decompression at L2 was sharply delineated with a curette. Facets were removed with osteotome and curette. The inferior portion of the L1 lamina and residual L2 lamina removed. Curette used to separate scarred dura from the L3 superior articular facets and overhanging portion of these were resected bilaterally and into the foramen. At the end of decompression, ball probe could be placed around the L2 and L3 pedicles into the foramen without nerve root compromise. The right L3 nerve root and dural tube mobilized medially exposing the underlying extruded right L2-3 disk herniation. Hole was visible in the posterior annulus. Pressure about the hole exposed several free fragments of disk material which were removed. Pedicle holes were then made bilaterally, L2. Holes probed with depth gauge, felt to be within bone. Metallic markers placed. Radiographs obtained were satisfactory. Beginning first on the left-hand side, new pedicle screws were placed, L2 and L3. Remainder of the screws, L4-5 and S1 had been left in place. In a similar fashion, right-hand side decorticated bone graft and screws placed. Radiographs obtained were satisfactory. Medial pedicle was palpated. No exposed hardware to L2 and L3. Rods contoured and placed bilaterally followed by set screws which were tightened and torqued 120-inch pounds.     The right L3 nerve root and dural tube was then retracted medially and the right L2-3 disk herniation removed in total with osteotome, curettes, grabbers, interbody shapers in preparation for interbody fusion. Bone graft material was impacted anteriorly into the L2-3 disk space followed by placement of 9-mm Zyston cage which was turned to a transverse position. Final radiographs obtained were satisfactory. All retractor blades removed. No active bleeding. Gelfoam placed over the exposed dura. Additional bone graft placed about the rods, L2 to L3 and then the wound closed in layers with powdered vancomycin in deep subcutaneous tissue. Wound dressed. The patient awakened, taken to recovery room in satisfactory condition without complication. Estimated blood loss was 600 mL. The patient received 250 mL cell saver blood, 1500 mL crystalloid fluid, 250 mL urine output. No specimen. No tubes or drains. Needle and sponge count correct without complication. At time of dictation, the patient not awakened sufficiently to test motor sensation. Janelle Nugent MD      [id number]  D:  07/24/2020 11:31  T:  07/24/2020 15:26  JOB #:  [id number]  CC:   MD Sylvia Rick MD Norrine Pedlar, MD

## 2020-07-25 LAB
BASOPHILS # BLD: 0 K/UL (ref 0–0.1)
BASOPHILS NFR BLD: 0 % (ref 0–2)
DIFFERENTIAL METHOD BLD: ABNORMAL
EOSINOPHIL # BLD: 0 K/UL (ref 0–0.4)
EOSINOPHIL NFR BLD: 0 % (ref 0–5)
ERYTHROCYTE [DISTWIDTH] IN BLOOD BY AUTOMATED COUNT: 14.2 % (ref 11.6–14.5)
HCT VFR BLD AUTO: 33.6 % (ref 35–45)
HGB BLD-MCNC: 10.5 G/DL (ref 12–16)
LYMPHOCYTES # BLD: 1.3 K/UL (ref 0.9–3.6)
LYMPHOCYTES NFR BLD: 9 % (ref 21–52)
MCH RBC QN AUTO: 30.3 PG (ref 24–34)
MCHC RBC AUTO-ENTMCNC: 31.3 G/DL (ref 31–37)
MCV RBC AUTO: 97.1 FL (ref 74–97)
MONOCYTES # BLD: 1.6 K/UL (ref 0.05–1.2)
MONOCYTES NFR BLD: 11 % (ref 3–10)
NEUTS SEG # BLD: 11.4 K/UL (ref 1.8–8)
NEUTS SEG NFR BLD: 80 % (ref 40–73)
PLATELET # BLD AUTO: 228 K/UL (ref 135–420)
PMV BLD AUTO: 10.2 FL (ref 9.2–11.8)
RBC # BLD AUTO: 3.46 M/UL (ref 4.2–5.3)
WBC # BLD AUTO: 14.3 K/UL (ref 4.6–13.2)

## 2020-07-25 PROCEDURE — 65270000029 HC RM PRIVATE

## 2020-07-25 PROCEDURE — 97162 PT EVAL MOD COMPLEX 30 MIN: CPT

## 2020-07-25 PROCEDURE — 74011000258 HC RX REV CODE- 258: Performed by: INTERNAL MEDICINE

## 2020-07-25 PROCEDURE — 36415 COLL VENOUS BLD VENIPUNCTURE: CPT

## 2020-07-25 PROCEDURE — 74011250636 HC RX REV CODE- 250/636: Performed by: ORTHOPAEDIC SURGERY

## 2020-07-25 PROCEDURE — 74011250637 HC RX REV CODE- 250/637: Performed by: INTERNAL MEDICINE

## 2020-07-25 PROCEDURE — 74011000258 HC RX REV CODE- 258: Performed by: ORTHOPAEDIC SURGERY

## 2020-07-25 PROCEDURE — 74011250636 HC RX REV CODE- 250/636: Performed by: INTERNAL MEDICINE

## 2020-07-25 PROCEDURE — 74011000250 HC RX REV CODE- 250: Performed by: ORTHOPAEDIC SURGERY

## 2020-07-25 PROCEDURE — 85025 COMPLETE CBC W/AUTO DIFF WBC: CPT

## 2020-07-25 RX ORDER — METOPROLOL SUCCINATE 25 MG/1
25 TABLET, EXTENDED RELEASE ORAL ONCE
Status: COMPLETED | OUTPATIENT
Start: 2020-07-25 | End: 2020-07-25

## 2020-07-25 RX ORDER — ADHESIVE BANDAGE
30 BANDAGE TOPICAL DAILY PRN
Status: DISCONTINUED | OUTPATIENT
Start: 2020-07-25 | End: 2020-07-29 | Stop reason: HOSPADM

## 2020-07-25 RX ORDER — DOCUSATE SODIUM 100 MG/1
100 CAPSULE, LIQUID FILLED ORAL 2 TIMES DAILY
Status: DISCONTINUED | OUTPATIENT
Start: 2020-07-25 | End: 2020-07-29 | Stop reason: HOSPADM

## 2020-07-25 RX ORDER — NALOXONE HYDROCHLORIDE 0.4 MG/ML
0.4 INJECTION, SOLUTION INTRAMUSCULAR; INTRAVENOUS; SUBCUTANEOUS AS NEEDED
Status: DISCONTINUED | OUTPATIENT
Start: 2020-07-25 | End: 2020-07-29 | Stop reason: HOSPADM

## 2020-07-25 RX ORDER — HYDROMORPHONE HYDROCHLORIDE 1 MG/ML
1 INJECTION, SOLUTION INTRAMUSCULAR; INTRAVENOUS; SUBCUTANEOUS
Status: DISCONTINUED | OUTPATIENT
Start: 2020-07-25 | End: 2020-07-27

## 2020-07-25 RX ADMIN — SODIUM CHLORIDE 10 ML: 9 INJECTION, SOLUTION INTRAMUSCULAR; INTRAVENOUS; SUBCUTANEOUS at 14:14

## 2020-07-25 RX ADMIN — DEXTROSE MONOHYDRATE AND SODIUM CHLORIDE 125 ML/HR: 5; .9 INJECTION, SOLUTION INTRAVENOUS at 05:57

## 2020-07-25 RX ADMIN — DEXTROSE MONOHYDRATE AND SODIUM CHLORIDE 125 ML/HR: 5; .9 INJECTION, SOLUTION INTRAVENOUS at 21:31

## 2020-07-25 RX ADMIN — DIAZEPAM 5 MG: 5 TABLET ORAL at 14:14

## 2020-07-25 RX ADMIN — DEXTROSE MONOHYDRATE AND SODIUM CHLORIDE 125 ML/HR: 5; .9 INJECTION, SOLUTION INTRAVENOUS at 14:16

## 2020-07-25 RX ADMIN — DIAZEPAM 5 MG: 5 TABLET ORAL at 21:28

## 2020-07-25 RX ADMIN — ALUMINUM HYDROXIDE, MAGNESIUM HYDROXIDE, AND SIMETHICONE 30 ML: 200; 200; 20 SUSPENSION ORAL at 21:40

## 2020-07-25 RX ADMIN — PANTOPRAZOLE SODIUM 40 MG: 40 TABLET, DELAYED RELEASE ORAL at 08:12

## 2020-07-25 RX ADMIN — ATORVASTATIN CALCIUM 10 MG: 10 TABLET, FILM COATED ORAL at 21:28

## 2020-07-25 RX ADMIN — ESCITALOPRAM OXALATE 20 MG: 20 TABLET ORAL at 08:16

## 2020-07-25 RX ADMIN — SODIUM CHLORIDE 10 ML: 9 INJECTION, SOLUTION INTRAMUSCULAR; INTRAVENOUS; SUBCUTANEOUS at 05:57

## 2020-07-25 RX ADMIN — OXYCODONE HYDROCHLORIDE AND ACETAMINOPHEN 2 TABLET: 10; 325 TABLET ORAL at 20:11

## 2020-07-25 RX ADMIN — METOPROLOL SUCCINATE 25 MG: 25 TABLET, EXTENDED RELEASE ORAL at 08:54

## 2020-07-25 RX ADMIN — HYDROMORPHONE HYDROCHLORIDE 1 MG: 1 INJECTION, SOLUTION INTRAMUSCULAR; INTRAVENOUS; SUBCUTANEOUS at 16:44

## 2020-07-25 RX ADMIN — OXYCODONE HYDROCHLORIDE AND ACETAMINOPHEN 2 TABLET: 10; 325 TABLET ORAL at 08:12

## 2020-07-25 RX ADMIN — CEFAZOLIN 2 G: 10 INJECTION, POWDER, FOR SOLUTION INTRAVENOUS at 05:56

## 2020-07-25 RX ADMIN — DOCUSATE SODIUM 100 MG: 100 CAPSULE, LIQUID FILLED ORAL at 18:20

## 2020-07-25 NOTE — PROGRESS NOTES
Problem: Mobility Impaired (Adult and Pediatric)  Goal: *Acute Goals and Plan of Care (Insert Text)  Description: Physical Therapy Goals  Initiated 7/25/2020 and to be accomplished within 7 day(s)  1. Patient will move from supine to sit and sit to supine  in bed with modified independence. 2.  Patient will transfer from bed to chair and chair to bed with modified independence using the least restrictive device. 3.  Patient will perform sit to stand with modified independence. 4.  Patient will ambulate with modified independence for 50 feet with the least restrictive device. 5.  Patient will ascend/descend 5 stairs with handrail(s) with supervision/set-up. Outcome: Progressing Towards Goal   PHYSICAL THERAPY EVALUATION    Patient: Ferdinand Jansen (10 y.o. female)  Date: 7/25/2020  Primary Diagnosis: HNP (herniated nucleus pulposus), lumbar [M51.26]  Procedure(s) (LRB):  Decompression l2-l3 right l2-l3 extend fusion to l2, posterolateral spine fusion l2-l3, transforminal interbody fusion l2-l3, and cage placement (N/A) 1 Day Post-Op   Precautions: Fall, Spinal  PLOF: Independent  ASSESSMENT :  Educated on lumbar precautions and role of lumbar brace; verbalized understanding. Reports amb with CNA to/from bathroom this AM. BLE full AROM. Denies numbness and tingling in BLE. Agreeable to trial supine to sit transfer despite 9/10 pain. Attempted rolling with min A and HOB elevated; immediate back spasms and need to return to supine. Allowed to rest approximately 3 minutes; attempted rolling second time with min A. Again causing back spasms and unable to complete transfer. Request pain meds prior to further mobility; RN Alisha Lowery notified. Educated on benefits of mobility and encouraged continued amb to restroom/bedside commode and OOB to chair as tolerated; verbalized understanding. PT to follow up for continued assessment and treatment of functional mobility.  Educated on need for RN assistance with mobility; verbalized understanding. Call bell in reach. Independent prior to admission. Lives in 2 story home with 5 steps to enter with handrails. Has 1st floor set-up available if needed. Spouse and mother available for assist upon discharge to home. Familiar with lumbar brace from prior lumbar surgery. Patient will benefit from skilled intervention to address the above impairments. Patient's rehabilitation potential is considered to be Fair  Factors which may influence rehabilitation potential include:   []         None noted  []         Mental ability/status  [x]         Medical condition  []         Home/family situation and support systems  []         Safety awareness  [x]         Pain tolerance/management  []         Other:      PLAN :  Recommendations and Planned Interventions:   [x]           Bed Mobility Training             [x]    Neuromuscular Re-Education  [x]           Transfer Training                   []    Orthotic/Prosthetic Training  [x]           Gait Training                          []    Modalities  [x]           Therapeutic Exercises           []    Edema Management/Control  [x]           Therapeutic Activities            [x]    Family Training/Education  [x]           Patient Education  []           Other (comment):    Frequency/Duration: Patient will be followed by physical therapy 3-5 times a week to address goals. Discharge Recommendations: Home Health  Further Equipment Recommendations for Discharge: brace/splint and rolling walker     SUBJECTIVE:   Patient stated \"I was up earlier.     OBJECTIVE DATA SUMMARY:     Past Medical History:   Diagnosis Date    Adverse effect of anesthesia     Pt.  awakens screaming and flailing    Hypercholesteremia     Paroxysmal atrioventricular tachycardia (HCC)     Sleep apnea     C PAP     Past Surgical History:   Procedure Laterality Date    HX HYSTERECTOMY      HX LUMBAR DISKECTOMY      HX LUMBAR DISKECTOMY      HX LUMBAR FUSION      HX ORTHOPAEDIC Ulnar nrve revision    HX ORTHOPAEDIC      Brachial nerve revision    HX ORTHOPAEDIC Right     trigger finer release     Barriers to Learning/Limitations: None  Compensate with: Visual Cues, Verbal Cues, Tactile Cues and Kinesthetic Cues    Home Situation:  Home Situation  Home Environment: Private residence  # Steps to Enter: 5  Rails to Enter: Yes  One/Two Story Residence: Two story  # of Interior Steps: 14  Living Alone: No  Support Systems: Spouse/Significant Other/Partner, Family member(s)  Patient Expects to be Discharged to[de-identified] Private residence  Current DME Used/Available at Home: None    Critical Behavior:  Neurologic State: Alert  Orientation Level: Oriented X4  Cognition: Follows commands     Psychosocial  Patient Behaviors: Cooperative    Strength:    Manual Muscle Testing (LE) (per bed level assessment)         R     L    Hip Flexion:   3+/5  3+/5  Knee EXT:   3+/5  3+/5  Knee FLEX:   3+/5  3+/5  Ankle DF:   3+/5  3+/5  _________________________________________________   Range Of Motion:  BLE AROM WFL  Functional Mobility:  Bed Mobility:  Rolling: Minimum assistance    Pain:  Pain level pre-treatment: 9/10   Pain level post-treatment: 9/10     Activity Tolerance:   Fair    After treatment:   []         Patient left in no apparent distress sitting up in chair  [x]         Patient left in no apparent distress in bed  [x]         Call bell left within reach  [x]         Nursing notified  []         Caregiver present  []         Bed alarm activated  []         SCDs applied    COMMUNICATION/EDUCATION:   [x]         Role of physical therapy and plan of care in the acute care setting. [x]         Fall prevention education was provided and the patient/caregiver indicated understanding. [x]         Patient/family have participated as able in goal setting and plan of care. []         Patient/family agree to work toward stated goals and plan of care.   []         Patient understands intent and goals of therapy, but is neutral about his/her participation. []         Patient is unable to participate in goal setting/plan of care: ongoing with therapy staff.     Thank you for this referral.  Dai Bhandari, PT   Time Calculation: 11 mins    Eval Complexity: History: MEDIUM  Complexity : 1-2 comorbidities / personal factors will impact the outcome/ POC Exam:MEDIUM Complexity : 3 Standardized tests and measures addressing body structure, function, activity limitation and / or participation in recreation  Presentation: MEDIUM Complexity : Evolving with changing characteristics  Clinical Decision Making:Medium Complexity    Clinical judgement; ROM, MMT, functional mobility Overall Complexity:MEDIUM

## 2020-07-25 NOTE — PHYSICIAN ADVISORY
Letter of Status Determination:  
Recommend hospitalization status upgraded from INPATIENT  to INPATIENT  Status Pt Name:  Abner Littlejohn MR#  
DRE # [id number] / 
[id number] Payor: Jen Rivera / Plan: [address] / Product Type: Medicare /   
REESE#  [id number] [facility]  2407/01  @ [address] Hospitalization date  7/24/2020  5:44 AM  
Current Attending Physician  Niya Reese MD  
Principal diagnosis  HNP (herniated nucleus pulposus), lumbar [M51.26] Clinicals  62 y.o. y.o  female hospitalized with above diagnosis Post uneventful L/S decompression and fusion-stable Post op pain Post op muscle spasms PAT 
GERD Obesity Leukocytosis; no fever 
  
PLAN Resume PTA meds as conditions allow Valium Percocet Zofran IVF [place] (MCG) criteria Does   apply Post op pain, on PTA STATUS DETERMINATION  Inpatient appropiate Additional comments Payor: Jen Rivera / Plan: [address] / Product Type: Medicare /   
  
 
 
Florinda Cao [address] [place] [address] T: [phone]    [email or web address]

## 2020-07-25 NOTE — PROGRESS NOTES
Patient received in bed awake. Patient A&O, denies pain and discomfort. No distress noted. Frequently use items within reach. Bed locked in low position. Call bell within reach and Patient verbalized understanding of use for assistance and needs. 0800- Pt made aware of order to DC antonio catheter, voiced understanding. Antonio dc 'd emptied 1000 ml of yellow urine; no sediments noted. Pt tolerated well. Made aware of need of urinating w/in 6 hrs, voiced understanding. 6888- Patient given Percocet 10- 325 mg 2 tabs PO for pain 10/10. See MAR and pain assessments. 1889- Patient's BP 95/52; asymptomatic. Rechecked /50 and has Toprol XL 50 mg to [facility] Dr. Jono Sykes was called made aware T.O. received to give Toprol XL 25 mg PO. (RBV)    1110- Patient had been assisted to bathroom by CNA and voided to commode. This nurse informed Patient of urine collection container to be place in toilet for further urine output measurment; voiced understanding. 18- Dr. Jono Sykes to [facility] [place] made aware that Patient is requesting stool softener; voiced understanding. 1410- Patient c/o having back spasms; see MAR for prn Valium 5 mg PO to be given. Call bell w/in reach. 1450- Patient resting. No further c/o having back spasms. Call bell w/in reach. 1644- Patient given Dilaudid 1 mg for back pain 10/10. See MAR and pain assessments. 9479- Patient assisted to bathroom, voided 400 ml yellow urine. Call bell w/in reach.

## 2020-07-25 NOTE — PROGRESS NOTES
Problem: Falls - Risk of  Goal: *Absence of Falls  Description: Document Kyle Aleksander Fall Risk and appropriate interventions in the flowsheet.   Outcome: Progressing Towards Goal  Note: Fall Risk Interventions:  Mobility Interventions: Patient to call before getting OOB    Mentation Interventions: Update white board, Door open when patient unattended    Medication Interventions: Teach patient to arise slowly, Patient to call before getting OOB    Elimination Interventions: Call light in reach, Patient to call for help with toileting needs, Stay With Me (per policy)              Problem: Patient Education: Go to Patient Education Activity  Goal: Patient/Family Education  Outcome: Progressing Towards Goal     Problem: Pain  Goal: *Control of Pain  Outcome: Progressing Towards Goal     Problem: Infection - Risk of, Urinary Catheter-Associated Urinary Tract Infection  Goal: *Absence of infection signs and symptoms  Outcome: Progressing Towards Goal     Problem: High Risk or History of INGRID  Goal: Recognition of INGRID or High Risk for INGRID  Outcome: Progressing Towards Goal  Goal: Maintain Patent Airway and Adequate Oxygenation  Outcome: Progressing Towards Goal  Goal: Avoid Over-sedation  Outcome: Progressing Towards Goal  Goal: Maintenance Care of INGRID  Outcome: Progressing Towards Goal

## 2020-07-25 NOTE — PROGRESS NOTES
Problem: Discharge Planning  Goal: *Discharge to safe environment  Outcome: Resolved/Met   Plan home    Reason for Admission:   Lumbar fusion                   RUR Score:      7%               Plan for utilizing home health:   no       PCP: First and Last name:  Pollo ramírez   Name of Practice:    Are you a current patient: Yes/No: yes   Approximate date of last visit: June,    Can you participate in a virtual visit with your PCP: yes                    Current Advanced Directive/Advance Care Plan: none                         Transition of Care Plan:  Spoke with pt, lives with spouse. Mother here from [place] to be with her while recovers. Independent with adls and amb  Demographics correct  Dme: brace cpap has access to a walker. Spouse to transport home. She states he had a walker but got rid of it, that was 2 yrs ago, her ins bought it for her. Explained ins will not pay for another one until after 5 yrs. She can purchase one here for 53.00 or go to [employer] store or drug store. She states she has a friend who she can borrow one from. Patient has designated _______spouse________________ to participate in his/her discharge plan and to receive any needed information. Name: Ambar Soares  Address:  Phone number: [phone]    Care Management Interventions  PCP Verified by CM: Yes  Palliative Care Criteria Met (RRAT>21 & CHF Dx)?: No  Mode of Transport at Discharge:  Other (see comment)  Discharge Durable Medical Equipment: No  Physical Therapy Consult: No  Occupational Therapy Consult: No  Speech Therapy Consult: No  Current Support Network: Lives with Spouse  Confirm Follow Up Transport: Family  The Patient and/or Patient Representative was Provided with a Choice of Provider and Agrees with the Discharge Plan?: No  Freedom of Choice List was Provided with Basic Dialogue that Supports the Patient's Individualized Plan of Care/Goals, Treatment Preferences and Shares the Quality Data Associated with the Providers?: No  Discharge Location  Discharge Placement: Home

## 2020-07-25 NOTE — ROUTINE PROCESS
2005  TRANSFER - IN REPORT:    Verbal report received from Jorge Manzo RN on Osmani Stanford  being received from PACU for routine post - op      Report consisted of patients Situation, Background, Assessment and   Recommendations(SBAR). Information from the following report(s) SBAR, Kardex, OR Summary, Procedure Summary, Intake/Output, MAR and Recent Results was reviewed with the receiving nurse. Opportunity for questions and clarification was provided. Assessment completed upon patients arrival to unit and care assumed. Dressing dry and intact. SCDs bilaterally. 0000 Medicated for pain. Was assisted with CPAP by respiratory. 1733  Bedside and Verbal shift change report given to Ash Camilo RN, BSN (oncoming nurse) by Shira Collazo RN, BSN (offgoing nurse). Report included the following information SBAR, Kardex, OR Summary, Procedure Summary, Intake/Output, MAR and Recent Results.      Shira Collazo RN, BSN

## 2020-07-25 NOTE — PROGRESS NOTES
POD 1    Postoperative Diagnosis: m48.061       Procedure: Procedure(s):  Decompression l2-l3 right l2-l3 extend fusion to l2, posterolateral spine fusion l2-l3, transforminal interbody fusion l2-l3, and cage placement    HPI:   Uneventful night  BP bit low (95/52) this AM- asymptomatic; metoprolol 50 mg 0.5 tab given; must recent /50  C/O post op pain and muscle spasms; no LEs pain, numbness or weakness  No CP SOB  No N/V; No BM; bit of flatus  Rodriguez out; voided- not collected     ACTIVE MEDICAL PROBLEMS/PMH/PSH    Patient Active Problem List   Diagnosis Code    Lumbar stenosis M48.061    HNP (herniated nucleus pulposus), lumbar M51.26     Paroxysmal atrial tachycardia   Palpitations  Premature ventricular contractions   GERD  Dpression     Echocardiogram 01/17/2017:  NORMAL GLOBAL LEFT VENTRICULAR SYSTOLIC FUNCTION WITH AN ESTIMATED EJECTION FRACTION OF  65%. NORMAL DIASTOLIC FUNCTION. NORMAL CHAMBER SIZES. MILD DIFFUSE THICKENING (SCLEROSIS) OF THE AORTIC VALVE CUSPS WITHOUT REDUCED EXCURSION. NO HEMODYNAMICALLY SIGNIFICANT VALVULAR PATHOLOGY. NORMAL PULMONARY ARTERY PRESSURE OF 26 MMHG. NO PREVIOUS REPORT FOR COMPARISON.       Cardiac Event Monitor 06/18/19- report not available    PMH:  Past Medical History:   Diagnosis Date    Adverse effect of anesthesia     Pt.  awakens screaming and flailing    Hypercholesteremia     Paroxysmal atrioventricular tachycardia (HCC)     Sleep apnea     C PAP     OU cataracts    PSH:  Post lumbar spine decompression and fusion for lumbar spine stenosis and spondylosis 04/05/18    Past Surgical History:   Procedure Laterality Date    HX HYSTERECTOMY      HX LUMBAR DISKECTOMY      HX LUMBAR DISKECTOMY      HX LUMBAR FUSION      HX ORTHOPAEDIC      Ulnar nrve revision    HX ORTHOPAEDIC      Brachial nerve revision    HX ORTHOPAEDIC Right     trigger finer release       PTA MEDICATIONS  Medications Prior to Admission   Medication Sig    atorvastatin (LIPITOR) 10 mg tablet Take 10 mg by mouth At bedtime.  metoprolol succinate (TOPROL-XL) 50 mg XL tablet Take 50 mg by mouth daily.  escitalopram oxalate (LEXAPRO) 20 mg tablet Take 20 mg by mouth daily. ALLERGIES:    Allergies   Allergen Reactions    Shellfish Derived Anaphylaxis        FAMILY HISTORY   Father:  dementia 81 y/o  Mother: living 80 [facility]  Brothers: one One Arch Bari  Sisters : two One Arch Bari  Children: one     History reviewed. No pertinent family history.     SOCIAL HISTORY  Marital Status:  lives with spouse  Education Completed:  Occupation: NP  Tobacco use: never  Alcohol use: no  Drug use: no  STI: no  Living will: no  POA: no    Social History     Socioeconomic History    Marital status:      Spouse name: Not on file    Number of children: Not on file    Years of education: Not on file    Highest education level: Not on file   Occupational History    Not on file   Social Needs    Financial resource strain: Not on file    Food insecurity     Worry: Not on file     Inability: Not on file   [employer] needs     Medical: Not on file     Non-medical: Not on file   Tobacco Use    Smoking status: Never Smoker    Smokeless tobacco: Never Used   Substance and Sexual Activity    Alcohol use: No    Drug use: No    Sexual activity: Not on file   Lifestyle    Physical activity     Days per week: Not on file     Minutes per session: Not on file    Stress: Not on file   Relationships    Social connections     Talks on phone: Not on file     Gets together: Not on file     Attends Anabaptism service: Not on file     Active member of club or organization: Not on file     Attends meetings of clubs or organizations: Not on file     Relationship status: Not on file    Intimate partner violence     Fear of current or ex partner: Not on file     Emotionally abused: Not on file     Physically abused: Not on file     Forced sexual activity: Not on file   Other Topics Concern    Not on file Social History Narrative    Not on file       ROS:  Constitutional: No headache  Cardiac: HPI  Respiratory: No cough . No wheezing  GI:. HPI  : HPI  Skin: No rash. No itching      Physical Exam:      Visit Vitals  /50   Pulse 68   Temp 98.4 °F (36.9 °C)   Resp 16   Ht 5' 3\" (1.6 m)   Wt 83.6 kg (184 lb 4 oz)   SpO2 94%   BMI 32.64 kg/m²     GENERAL: Sitting OOB; Awake and  coherent; mild distress due muscle spasms and back pain  HEENT:    LUNGS: CTA. BS Normal Bilaterally  HEART: RRR. No murmur. No S3 S4  ABDOMEN: Soft. Normal BS. No tenderness. LE: No edema. SCD in place  PULSES: Radial 2+; PT 2+      Dressing clean and dry per nursing staff    NEUROLOGIC:     Muscle Strength  RLE: Quads AT GCS 5/5  LLE: Quads AT GCS 5/5     Labs Reviewed:    Recent Results (from the past 24 hour(s))   CBC WITH AUTOMATED DIFF    Collection Time: 07/24/20  1:30 PM   Result Value Ref Range    WBC 15.2 (H) 4.6 - 13.2 K/uL    RBC 4.08 (L) 4.20 - 5.30 M/uL    HGB 12.5 12.0 - 16.0 g/dL    HCT 38.7 35.0 - 45.0 %    MCV 94.9 74.0 - 97.0 FL    MCH 30.6 24.0 - 34.0 PG    MCHC 32.3 31.0 - 37.0 g/dL    RDW 13.9 11.6 - 14.5 %    PLATELET 952 307 - 594 K/uL    MPV 10.2 9.2 - 11.8 FL    NEUTROPHILS 93 (H) 40 - 73 %    LYMPHOCYTES 5 (L) 21 - 52 %    MONOCYTES 2 (L) 3 - 10 %    EOSINOPHILS 0 0 - 5 %    BASOPHILS 0 0 - 2 %    ABS. NEUTROPHILS 14.1 (H) 1.8 - 8.0 K/UL    ABS. LYMPHOCYTES 0.8 (L) 0.9 - 3.6 K/UL    ABS. MONOCYTES 0.4 0.05 - 1.2 K/UL    ABS. EOSINOPHILS 0.0 0.0 - 0.4 K/UL    ABS.  BASOPHILS 0.0 0.0 - 0.1 K/UL    DF AUTOMATED     METABOLIC PANEL, BASIC    Collection Time: 07/24/20  1:30 PM   Result Value Ref Range    Sodium 143 136 - 145 mmol/L    Potassium 4.5 3.5 - 5.5 mmol/L    Chloride 111 100 - 111 mmol/L    CO2 27 21 - 32 mmol/L    Anion gap 5 3.0 - 18 mmol/L    Glucose 128 (H) 74 - 99 mg/dL    BUN 15 7.0 - 18 MG/DL    Creatinine 0.97 0.6 - 1.3 MG/DL    BUN/Creatinine ratio 15 12 - 20      GFR est AA >60 >60 ml/min/1.73m2    GFR est non-AA 59 (L) >60 ml/min/1.73m2    Calcium 8.0 (L) 8.5 - 10.1 MG/DL   CBC WITH AUTOMATED DIFF    Collection Time: 07/25/20  6:40 AM   Result Value Ref Range    WBC 14.3 (H) 4.6 - 13.2 K/uL    RBC 3.46 (L) 4.20 - 5.30 M/uL    HGB 10.5 (L) 12.0 - 16.0 g/dL    HCT 33.6 (L) 35.0 - 45.0 %    MCV 97.1 (H) 74.0 - 97.0 FL    MCH 30.3 24.0 - 34.0 PG    MCHC 31.3 31.0 - 37.0 g/dL    RDW 14.2 11.6 - 14.5 %    PLATELET 322 816 - 399 K/uL    MPV 10.2 9.2 - 11.8 FL    NEUTROPHILS 80 (H) 40 - 73 %    LYMPHOCYTES 9 (L) 21 - 52 %    MONOCYTES 11 (H) 3 - 10 %    EOSINOPHILS 0 0 - 5 %    BASOPHILS 0 0 - 2 %    ABS. NEUTROPHILS 11.4 (H) 1.8 - 8.0 K/UL    ABS. LYMPHOCYTES 1.3 0.9 - 3.6 K/UL    ABS. MONOCYTES 1.6 (H) 0.05 - 1.2 K/UL    ABS. EOSINOPHILS 0.0 0.0 - 0.4 K/UL    ABS.  BASOPHILS 0.0 0.0 - 0.1 K/UL    DF AUTOMATED         ASSESSMENT  Post uneventful L/S decompression and fusion-stable  Post op pain  Post op muscle spasms  BP low normal asymptomatic  Post op anemia H/H dropped 2 grams from H/H 12.5/38.7 (Blood Drawn in PACU yesterday)  PAT  GERD   Obesity  Leukocytosis trending down; no fever    PLAN  Continue IVF  Advance diet   Colace  MOM  Decrease dilaudid   CBC in AM  Monitor UOP    Discussed with nursing staff    Marga Marin MD  7/25/2020, 6:54 PM    CELL [phone]

## 2020-07-25 NOTE — PROGRESS NOTES
POD 1   Assessment  Status post L2-3 decompression fusion      Plan   1. Mobilize with PT   2. TLSO for ambulation  3. Appreciate IM recs  4. Plan to d/c home Monday      Visit Vitals  /69 (BP 1 Location: Right arm, BP Patient Position: At rest)   Pulse 81   Temp 97.5 °F (36.4 °C)   Resp 16   Ht 5' 3\" (1.6 m)   Wt 83.6 kg (184 lb 4 oz)   SpO2 93%   BMI 32.64 kg/m²       CBC w/Diff    Lab Results   Component Value Date/Time    WBC 14.3 (H) 07/25/2020 06:40 AM    RBC 3.46 (L) 07/25/2020 06:40 AM    HCT 33.6 (L) 07/25/2020 06:40 AM    MCV 97.1 (H) 07/25/2020 06:40 AM    MCH 30.3 07/25/2020 06:40 AM    MCHC 31.3 07/25/2020 06:40 AM    RDW 14.2 07/25/2020 06:40 AM    Lab Results   Component Value Date/Time    MONOS 11 (H) 07/25/2020 06:40 AM    EOS 0 07/25/2020 06:40 AM    BASOS 0 07/25/2020 06:40 AM    RDW 14.2 07/25/2020 06:40 AM          HPI: Marilee George is a 62 y.o. female status post L2-3 decompression, fusion. Progressing, no new orthopedic complaints. Pain managed, denies headache, muscle spasms improved with valium. Physical exam: Neurovascular intact. Palpable DP/PT. Compartments soft. No evidence of DVT. Voiding, antonio out. Passing flatus, no BM. Tolerating PO. BLE with active 4/5 AT/GS/quad. Pt denies paresthesias. Dressing clean dry intact.           Corrina Yung NP  July 25, 2020

## 2020-07-25 NOTE — ROUTINE PROCESS
Bedside and Verbal shift change report given to Maria Esther RN (oncoming nurse) by Pepito Guardado RN, BSN (offgoing nurse). Report given with SBAR, Kardex, Intake/Output, MAR and Recent Results.

## 2020-07-26 LAB
BASOPHILS # BLD: 0 K/UL (ref 0–0.1)
BASOPHILS NFR BLD: 0 % (ref 0–2)
DIFFERENTIAL METHOD BLD: ABNORMAL
EOSINOPHIL # BLD: 0.1 K/UL (ref 0–0.4)
EOSINOPHIL NFR BLD: 1 % (ref 0–5)
ERYTHROCYTE [DISTWIDTH] IN BLOOD BY AUTOMATED COUNT: 14.5 % (ref 11.6–14.5)
HCT VFR BLD AUTO: 31.2 % (ref 35–45)
HGB BLD-MCNC: 9.7 G/DL (ref 12–16)
LYMPHOCYTES # BLD: 2.1 K/UL (ref 0.9–3.6)
LYMPHOCYTES NFR BLD: 18 % (ref 21–52)
MCH RBC QN AUTO: 30.5 PG (ref 24–34)
MCHC RBC AUTO-ENTMCNC: 31.1 G/DL (ref 31–37)
MCV RBC AUTO: 98.1 FL (ref 74–97)
MONOCYTES # BLD: 1.2 K/UL (ref 0.05–1.2)
MONOCYTES NFR BLD: 10 % (ref 3–10)
NEUTS SEG # BLD: 8.3 K/UL (ref 1.8–8)
NEUTS SEG NFR BLD: 71 % (ref 40–73)
PLATELET # BLD AUTO: 200 K/UL (ref 135–420)
PMV BLD AUTO: 9.8 FL (ref 9.2–11.8)
RBC # BLD AUTO: 3.18 M/UL (ref 4.2–5.3)
WBC # BLD AUTO: 11.7 K/UL (ref 4.6–13.2)

## 2020-07-26 PROCEDURE — 74011250636 HC RX REV CODE- 250/636: Performed by: INTERNAL MEDICINE

## 2020-07-26 PROCEDURE — 74011250637 HC RX REV CODE- 250/637: Performed by: INTERNAL MEDICINE

## 2020-07-26 PROCEDURE — 65270000029 HC RM PRIVATE

## 2020-07-26 PROCEDURE — 74011000250 HC RX REV CODE- 250: Performed by: ORTHOPAEDIC SURGERY

## 2020-07-26 PROCEDURE — 85025 COMPLETE CBC W/AUTO DIFF WBC: CPT

## 2020-07-26 PROCEDURE — 74011000258 HC RX REV CODE- 258: Performed by: INTERNAL MEDICINE

## 2020-07-26 PROCEDURE — 77030027138 HC INCENT SPIROMETER -A

## 2020-07-26 RX ORDER — DIAZEPAM 5 MG/1
5 TABLET ORAL
Status: DISCONTINUED | OUTPATIENT
Start: 2020-07-26 | End: 2020-07-29 | Stop reason: HOSPADM

## 2020-07-26 RX ORDER — METOPROLOL SUCCINATE 25 MG/1
25 TABLET, EXTENDED RELEASE ORAL ONCE
Status: COMPLETED | OUTPATIENT
Start: 2020-07-26 | End: 2020-07-26

## 2020-07-26 RX ORDER — METOPROLOL SUCCINATE 25 MG/1
25 TABLET, EXTENDED RELEASE ORAL DAILY
Status: DISCONTINUED | OUTPATIENT
Start: 2020-07-27 | End: 2020-07-29 | Stop reason: HOSPADM

## 2020-07-26 RX ADMIN — HYDROMORPHONE HYDROCHLORIDE 1 MG: 1 INJECTION, SOLUTION INTRAMUSCULAR; INTRAVENOUS; SUBCUTANEOUS at 20:55

## 2020-07-26 RX ADMIN — SODIUM CHLORIDE 10 ML: 9 INJECTION, SOLUTION INTRAMUSCULAR; INTRAVENOUS; SUBCUTANEOUS at 14:46

## 2020-07-26 RX ADMIN — DEXTROSE MONOHYDRATE AND SODIUM CHLORIDE 125 ML/HR: 5; .9 INJECTION, SOLUTION INTRAVENOUS at 17:55

## 2020-07-26 RX ADMIN — DOCUSATE SODIUM 100 MG: 100 CAPSULE, LIQUID FILLED ORAL at 17:42

## 2020-07-26 RX ADMIN — SODIUM CHLORIDE 10 ML: 9 INJECTION, SOLUTION INTRAMUSCULAR; INTRAVENOUS; SUBCUTANEOUS at 07:43

## 2020-07-26 RX ADMIN — METOPROLOL SUCCINATE 25 MG: 25 TABLET, EXTENDED RELEASE ORAL at 09:40

## 2020-07-26 RX ADMIN — OXYCODONE HYDROCHLORIDE AND ACETAMINOPHEN 2 TABLET: 10; 325 TABLET ORAL at 05:25

## 2020-07-26 RX ADMIN — DIAZEPAM 5 MG: 5 TABLET ORAL at 06:34

## 2020-07-26 RX ADMIN — HYDROMORPHONE HYDROCHLORIDE 1 MG: 1 INJECTION, SOLUTION INTRAMUSCULAR; INTRAVENOUS; SUBCUTANEOUS at 14:46

## 2020-07-26 RX ADMIN — DOCUSATE SODIUM 100 MG: 100 CAPSULE, LIQUID FILLED ORAL at 08:16

## 2020-07-26 RX ADMIN — PANTOPRAZOLE SODIUM 40 MG: 40 TABLET, DELAYED RELEASE ORAL at 08:16

## 2020-07-26 RX ADMIN — OXYCODONE HYDROCHLORIDE AND ACETAMINOPHEN 2 TABLET: 10; 325 TABLET ORAL at 11:27

## 2020-07-26 RX ADMIN — SODIUM CHLORIDE 10 ML: 9 INJECTION, SOLUTION INTRAMUSCULAR; INTRAVENOUS; SUBCUTANEOUS at 22:00

## 2020-07-26 RX ADMIN — DEXTROSE MONOHYDRATE AND SODIUM CHLORIDE 125 ML/HR: 5; .9 INJECTION, SOLUTION INTRAVENOUS at 07:45

## 2020-07-26 RX ADMIN — MAGNESIUM HYDROXIDE 30 ML: 400 SUSPENSION ORAL at 10:26

## 2020-07-26 RX ADMIN — ESCITALOPRAM OXALATE 20 MG: 20 TABLET ORAL at 08:16

## 2020-07-26 RX ADMIN — OXYCODONE HYDROCHLORIDE AND ACETAMINOPHEN 2 TABLET: 10; 325 TABLET ORAL at 00:27

## 2020-07-26 RX ADMIN — ATORVASTATIN CALCIUM 10 MG: 10 TABLET, FILM COATED ORAL at 22:00

## 2020-07-26 RX ADMIN — DIAZEPAM 5 MG: 5 TABLET ORAL at 22:19

## 2020-07-26 NOTE — ROUTINE PROCESS
Bedside and Verbal shift change report given to Maria Esther RN (oncoming nurse) by Jose L Mcfarland RN, BSN (offgoing nurse). Report given with SBAR, Kardex, Intake/Output, MAR and Recent Results.

## 2020-07-26 NOTE — PROGRESS NOTES
2015: Patient  In bed awake,alert and oriented x4. No signs of distress. Bed low and locked. Call bell within reach. Will continue monitoring. 0865: In bed sleeping,no other conern at this time,call bell within reach. Will continue monitoring. Patient Vitals for the past 12 hrs:   Temp Pulse Resp BP SpO2   07/26/20 0807 98.2 °F (36.8 °C) 78 16 94/60 93 %   07/26/20 0529 98.4 °F (36.9 °C) 94 18 108/59 93 %   07/26/20 0025 98.9 °F (37.2 °C) 81 17 105/59 94 %     0700: Bedside and Verbal shift change report given to FIDELINA Steele (oncoming nurse) by Dominguez Mart (offgoing nurse). Report included the following information SBAR, Kardex, MAR and Recent Results.

## 2020-07-26 NOTE — PROGRESS NOTES
Attempted to see patient for PT treatment however, pt refused due to increased pain levels 9/10. Per nurse if patients BP increases >044 systolic she can have some pain medication however, pt continued to refuse due to pain levels and current medical condition with BP. Nursing notified (Marc Chávez). No PT treatment given this day.     Ralf Gordon PT, DPT

## 2020-07-26 NOTE — PROGRESS NOTES
POD 2  Assessment  Status post L2-3 decompression fusion        Plan   1. Mobilize with PT   2. TLSO for ambulation  3. Pain control; hypotensive, limits pain med options   4. Appreciate IM recs  5. Plan to d/c home Monday or Tuesday depending on progression with PT and comfort      Visit Vitals  BP 99/63   Pulse 94   Temp 98.2 °F (36.8 °C)   Resp 16   Ht 5' 3\" (1.6 m)   Wt 83.6 kg (184 lb 4 oz)   SpO2 93%   BMI 32.64 kg/m²       CBC w/Diff    Lab Results   Component Value Date/Time    WBC 14.3 (H) 07/25/2020 06:40 AM    RBC 3.46 (L) 07/25/2020 06:40 AM    HCT 33.6 (L) 07/25/2020 06:40 AM    MCV 97.1 (H) 07/25/2020 06:40 AM    MCH 30.3 07/25/2020 06:40 AM    MCHC 31.3 07/25/2020 06:40 AM    RDW 14.2 07/25/2020 06:40 AM    Lab Results   Component Value Date/Time    MONOS 11 (H) 07/25/2020 06:40 AM    EOS 0 07/25/2020 06:40 AM    BASOS 0 07/25/2020 06:40 AM    RDW 14.2 07/25/2020 06:40 AM        HPI: Regino Dance is a 62 y.o. female status post L2-3 decompression, fusion. States her pain has increased overnight, also with c/o worsening spasms. Receiving dilaudid, percocet. She was unable to participate with PT do to pain. States she is unable to tolerate PO intake r/t nausea and bloating.  Denies headaches, voiding. Denies paresthesias.     Physical exam: Neurovascular intact. Palpable DP/PT. Compartments soft. No evidence of DVT. SCD's on. Voiding, antonio out. Passing flatus, no BM. BLE with active 4/5 AT/GS/quad. Dressing clean dry intact.       María Cain NP  July 26, 2020

## 2020-07-26 NOTE — PROGRESS NOTES
Problem: Falls - Risk of  Goal: *Absence of Falls  Description: Document Addie Desouza Fall Risk and appropriate interventions in the flowsheet.   Outcome: Progressing Towards Goal  Note: Fall Risk Interventions:  Mobility Interventions: Patient to call before getting OOB    Mentation Interventions: Adequate sleep, hydration, pain control, Toileting rounds, Update white board    Medication Interventions: Evaluate medications/consider consulting pharmacy, Patient to call before getting OOB    Elimination Interventions: Call light in reach, Patient to call for help with toileting needs              Problem: Patient Education: Go to Patient Education Activity  Goal: Patient/Family Education  Outcome: Progressing Towards Goal     Problem: Pain  Goal: *Control of Pain  Outcome: Progressing Towards Goal

## 2020-07-26 NOTE — PROGRESS NOTES
Problem: Falls - Risk of  Goal: *Absence of Falls  Description: Document Jose Alberto Tatum Fall Risk and appropriate interventions in the flowsheet.   Outcome: Progressing Towards Goal  Note: Fall Risk Interventions:  Mobility Interventions: Patient to call before getting OOB, Utilize walker, cane, or other assistive device, Utilize gait belt for transfers/ambulation    Mentation Interventions: Adequate sleep, hydration, pain control, Toileting rounds, Update white board    Medication Interventions: Evaluate medications/consider consulting pharmacy, Patient to call before getting OOB, Teach patient to arise slowly    Elimination Interventions: Call light in reach, Toilet paper/wipes in reach              Problem: Patient Education: Go to Patient Education Activity  Goal: Patient/Family Education  Outcome: Progressing Towards Goal     Problem: Pain  Goal: *Control of Pain  Outcome: Progressing Towards Goal     Problem: Infection - Risk of, Urinary Catheter-Associated Urinary Tract Infection  Goal: *Absence of infection signs and symptoms  Outcome: Progressing Towards Goal     Problem: High Risk or History of NIGRID  Goal: Recognition of INGRID or High Risk for INGRID  Outcome: Progressing Towards Goal  Goal: Maintain Patent Airway and Adequate Oxygenation  Outcome: Progressing Towards Goal  Goal: Avoid Over-sedation  Outcome: Progressing Towards Goal  Goal: Maintenance Care of INGRID  Outcome: Progressing Towards Goal     Problem: Patient Education: Go to Patient Education Activity  Goal: Patient/Family Education  Outcome: Progressing Towards Goal

## 2020-07-26 NOTE — PROGRESS NOTES
0912- Patient's BP 94/60; asymptomatic. Rechecked BP 99/63 and has Toprol XL 50 mg to [facility] Dr. Sushma Hanna was called made aware T.O. received to give Toprol XL 25 mg PO. (RBV)    1022- Patient c/o having bloating and constipation. LBM 7/26. See MAR for prn M.O.M. to be given. Call bell w/in reach. 1122- Patient has not had BM. Call bell w/in reach. 1127- Patient given Percocet 10- 325 mg 2 tabs PO for pain 9/10. See MAR and pain assessments. 1446- Patient given Dilaudid 1 mg IV for back pain 7/10. See MAR and pain assessments.

## 2020-07-26 NOTE — PROGRESS NOTES
Patient received in bed awake. Patient A&O, denies pain and discomfort. No distress noted. Frequently use items within reach. Bed locked in low position. Call bell within reach and Patient verbalized understanding of use for assistance and needs.

## 2020-07-26 NOTE — PROGRESS NOTES
POD 3    Postoperative Diagnosis: m48.061       Procedure: Procedure(s):  Decompression l2-l3 right l2-l3 extend fusion to l2, posterolateral spine fusion l2-l3, transforminal interbody fusion l2-l3, and cage placement    HPI:   C/O post op pain and muscle spasms  No dilaudid given since 07/25/20 at 1644  Last dose of Percocet  2 tabs given at 1127 this AM; patient has been asking and receiving Percocet 2 tablets about every 4 hrs since yesterday at  2011; prior to that she did not received any percocet since 8:12 AM  I Keep receiving call every morning about patient's BP; lowest BP 95/52 yesterday AM;this AM BP 94/60 Patient is known to have low normal BP; she takes metoprolol succinate ER 50 mg daily for PAT; so far she has been totally asymptomatic; metoprolol Succ ER 50 mg  0.5 tablet given yesterday and today  Somehow  Valium order in the system is for sedation and not for muscle spasms ; I do not recall entering Valium order for sedation;  not certain if this was a verbal order or inadvertently done but anyway valium changed for muscle spasms only !!!! No CP, SOB or Palpitations  Tolerating diet  No N/V; No BM; bit of flatus  Voiding well    CBC ordered for today at 7 AM, it was yet reported at 12:47    [address] [facility] PROBLEMS/PMH/PSH    Patient Active Problem List   Diagnosis Code    Lumbar stenosis M48.061    HNP (herniated nucleus pulposus), lumbar M51.26     Paroxysmal atrial tachycardia   Palpitations  Premature ventricular contractions   GERD  Dpression     Echocardiogram 01/17/2017:  NORMAL GLOBAL LEFT VENTRICULAR SYSTOLIC FUNCTION WITH AN ESTIMATED EJECTION FRACTION OF  65%. NORMAL DIASTOLIC FUNCTION. NORMAL CHAMBER SIZES. MILD DIFFUSE THICKENING (SCLEROSIS) OF THE AORTIC VALVE CUSPS WITHOUT REDUCED EXCURSION. NO HEMODYNAMICALLY SIGNIFICANT VALVULAR PATHOLOGY. NORMAL PULMONARY ARTERY PRESSURE OF 26 MMHG. NO PREVIOUS REPORT FOR COMPARISON.       Cardiac Event Monitor 06/18/19- report not available    PMH:  Past Medical History:   Diagnosis Date    Adverse effect of anesthesia     Pt. awakens screaming and flailing    Hypercholesteremia     Paroxysmal atrioventricular tachycardia (HCC)     Sleep apnea     C PAP     OU cataracts    PSH:  Post lumbar spine decompression and fusion for lumbar spine stenosis and spondylosis 18    Past Surgical History:   Procedure Laterality Date    HX HYSTERECTOMY      HX LUMBAR DISKECTOMY      HX LUMBAR DISKECTOMY      HX LUMBAR FUSION      HX ORTHOPAEDIC      Ulnar nrve revision    HX ORTHOPAEDIC      Brachial nerve revision    HX ORTHOPAEDIC Right     trigger finer release       PTA MEDICATIONS  Medications Prior to Admission   Medication Sig    atorvastatin (LIPITOR) 10 mg tablet Take 10 mg by mouth At bedtime.  metoprolol succinate (TOPROL-XL) 50 mg XL tablet Take 50 mg by mouth daily.  escitalopram oxalate (LEXAPRO) 20 mg tablet Take 20 mg by mouth daily. ALLERGIES:    Allergies   Allergen Reactions    Shellfish Derived Anaphylaxis        FAMILY HISTORY   Father:  dementia 81 y/o  Mother: living 80 [facility]  Brothers: one One Arch Bari  Sisters : two One Arch Bari  Children: one     History reviewed. No pertinent family history.     SOCIAL HISTORY  Marital Status:  lives with spouse  Education Completed:  Occupation: NP  Tobacco use: never  Alcohol use: no  Drug use: no  STI: no  Living will: no  POA: no    Social History     Socioeconomic History    Marital status:      Spouse name: Not on file    Number of children: Not on file    Years of education: Not on file    Highest education level: Not on file   Occupational History    Not on file   Social Needs    Financial resource strain: Not on file    Food insecurity     Worry: Not on file     Inability: Not on file    Transportation needs     Medical: Not on file     Non-medical: Not on file   Tobacco Use    Smoking status: Never Smoker    Smokeless tobacco: Never Used Substance and Sexual Activity    Alcohol use: No    Drug use: No    Sexual activity: Not on file   Lifestyle    Physical activity     Days per week: Not on file     Minutes per session: Not on file    Stress: Not on file   Relationships    Social connections     Talks on phone: Not on file     Gets together: Not on file     Attends Alevism service: Not on file     Active member of club or organization: Not on file     Attends meetings of clubs or organizations: Not on file     Relationship status: Not on file    Intimate partner violence     Fear of current or ex partner: Not on file     Emotionally abused: Not on file     Physically abused: Not on file     Forced sexual activity: Not on file   Other Topics Concern    Not on file   Social History Narrative    Not on file       ROS:  Constitutional: No headache  Cardiac: HPI  Respiratory: No cough . No wheezing  GI:. HPI  : HPI  Skin: No rash. No itching      Physical Exam:      Visit Vitals  /72 (BP 1 Location: Right arm, BP Patient Position: At rest)   Pulse 96   Temp 98.5 °F (36.9 °C)   Resp 16   Ht 5' 3\" (1.6 m)   Wt 83.6 kg (184 lb 4 oz)   SpO2 95%   BMI 32.64 kg/m²     GENERAL: Lying in bed eating lunch; NAD  HEENT:    LUNGS: CTA. BS Normal Bilaterally  HEART: RRR. No murmur. ABDOMEN: Soft. Normal BS. No tenderness. LE: No edema. SCD in place  PULSES: Radial 2+; PT 2+        NEUROLOGIC:     Muscle Strength  RLE: Quads AT GCS 5/5  LLE: Quads AT GCS 5/5     Labs Reviewed:    No results found for this or any previous visit (from the past 24 hour(s)). ASSESSMENT  Post uneventful L/S decompression and fusion-stable  Post op pain  Post op muscle spasms  BP low normal  at base line -asymptomatic  Post op anemia- today's H/H 9.7/31.2- H/H basically stable since yesterday; 07/24/20 H/H 12.5/38.7  PAT- asymptomatic  GERD   Obesity  Leukocytosis  resolved    PLAN  Metoprolol succ.  ER decreased to 25 mg; hopefully this can avoid any Low BP concern and the patient will   Receive her  pain meds as ordered    Zayda Hoang MD     7/26/2020, 1;39 PM    CELL [phone]

## 2020-07-27 PROCEDURE — 74011000258 HC RX REV CODE- 258: Performed by: INTERNAL MEDICINE

## 2020-07-27 PROCEDURE — 74011250637 HC RX REV CODE- 250/637: Performed by: INTERNAL MEDICINE

## 2020-07-27 PROCEDURE — 77010033678 HC OXYGEN DAILY

## 2020-07-27 PROCEDURE — 74011250636 HC RX REV CODE- 250/636: Performed by: INTERNAL MEDICINE

## 2020-07-27 PROCEDURE — 97116 GAIT TRAINING THERAPY: CPT

## 2020-07-27 PROCEDURE — 65270000029 HC RM PRIVATE

## 2020-07-27 PROCEDURE — 97112 NEUROMUSCULAR REEDUCATION: CPT

## 2020-07-27 RX ORDER — HYDROMORPHONE HYDROCHLORIDE 1 MG/ML
0.5 INJECTION, SOLUTION INTRAMUSCULAR; INTRAVENOUS; SUBCUTANEOUS
Status: DISCONTINUED | OUTPATIENT
Start: 2020-07-27 | End: 2020-07-29 | Stop reason: HOSPADM

## 2020-07-27 RX ADMIN — ALUMINUM HYDROXIDE, MAGNESIUM HYDROXIDE, AND SIMETHICONE 30 ML: 200; 200; 20 SUSPENSION ORAL at 15:58

## 2020-07-27 RX ADMIN — METOPROLOL SUCCINATE 25 MG: 25 TABLET, EXTENDED RELEASE ORAL at 17:41

## 2020-07-27 RX ADMIN — DIAZEPAM 5 MG: 5 TABLET ORAL at 09:15

## 2020-07-27 RX ADMIN — OXYCODONE HYDROCHLORIDE AND ACETAMINOPHEN 2 TABLET: 10; 325 TABLET ORAL at 06:16

## 2020-07-27 RX ADMIN — ESCITALOPRAM OXALATE 20 MG: 20 TABLET ORAL at 09:15

## 2020-07-27 RX ADMIN — PANTOPRAZOLE SODIUM 40 MG: 40 TABLET, DELAYED RELEASE ORAL at 08:22

## 2020-07-27 RX ADMIN — DOCUSATE SODIUM 100 MG: 100 CAPSULE, LIQUID FILLED ORAL at 09:15

## 2020-07-27 RX ADMIN — HYDROMORPHONE HYDROCHLORIDE 1 MG: 1 INJECTION, SOLUTION INTRAMUSCULAR; INTRAVENOUS; SUBCUTANEOUS at 03:05

## 2020-07-27 RX ADMIN — SODIUM CHLORIDE 10 ML: 9 INJECTION, SOLUTION INTRAMUSCULAR; INTRAVENOUS; SUBCUTANEOUS at 14:00

## 2020-07-27 RX ADMIN — OXYCODONE HYDROCHLORIDE AND ACETAMINOPHEN 2 TABLET: 10; 325 TABLET ORAL at 20:42

## 2020-07-27 RX ADMIN — OXYCODONE HYDROCHLORIDE AND ACETAMINOPHEN 2 TABLET: 10; 325 TABLET ORAL at 11:18

## 2020-07-27 RX ADMIN — SODIUM CHLORIDE 10 ML: 9 INJECTION, SOLUTION INTRAMUSCULAR; INTRAVENOUS; SUBCUTANEOUS at 06:16

## 2020-07-27 RX ADMIN — DIAZEPAM 5 MG: 5 TABLET ORAL at 18:10

## 2020-07-27 RX ADMIN — DEXTROSE MONOHYDRATE AND SODIUM CHLORIDE 125 ML/HR: 5; .9 INJECTION, SOLUTION INTRAVENOUS at 03:11

## 2020-07-27 RX ADMIN — DOCUSATE SODIUM 100 MG: 100 CAPSULE, LIQUID FILLED ORAL at 17:41

## 2020-07-27 RX ADMIN — OXYCODONE HYDROCHLORIDE AND ACETAMINOPHEN 2 TABLET: 10; 325 TABLET ORAL at 00:02

## 2020-07-27 RX ADMIN — ATORVASTATIN CALCIUM 10 MG: 10 TABLET, FILM COATED ORAL at 22:11

## 2020-07-27 RX ADMIN — SODIUM CHLORIDE 10 ML: 9 INJECTION, SOLUTION INTRAMUSCULAR; INTRAVENOUS; SUBCUTANEOUS at 22:11

## 2020-07-27 NOTE — PROGRESS NOTES
conducted an initial consultation and Spiritual Assessment for Geovany Kearns, who is a 62 y.o.,female. Patients Primary Language is: [place]. According to the patients EMR Oriental orthodox Affiliation is: Alevism. The reason the Patient came to the hospital is:   Patient Active Problem List    Diagnosis Date Noted    HNP (herniated nucleus pulposus), lumbar 07/24/2020    Lumbar stenosis 04/04/2018        The  provided the following Interventions:  Initiated a relationship of care and support with patient in room 2407 this morning as she is recovering from surgery to her back on last Friday. Patient still in pain but pushing through it. Listened empathically as she talked about hr being here and the surgery that she had. She spoke about lots of rods and pins now in her body and of her hope for a brighter future. Offered her the virtual video assistance  That we have to see her family but she refused since sh has her own laptop in the bed with her. Provided information about Spiritual Care Services. Offered prayer and assurance of continued prayers on patients behalf. The following outcomes were achieved:  Patient shared limited information about her medical narrative and spiritual journey/beliefs. Patient processed feeling about current hospitalization. Patient expressed gratitude for pastoral care visit. Assessment:  Patient does not have any Muslim/cultural needs that will affect patients preferences in health care. There are no further spiritual or Muslim issues which require Spiritual Care Services interventions at this time. Plan:  Chaplains will continue to follow and will provide pastoral care on an as needed/requested basis    . Philip Collado   Spiritual Care   [phone]

## 2020-07-27 NOTE — PROGRESS NOTES
Chart reviewed. Plan remains home when medically stable. Per prior CM note, she will borrow a walker from a friend. Will cont to follow. Charlene Breaux RN,ext [phone].

## 2020-07-27 NOTE — PROGRESS NOTES
Problem: Falls - Risk of  Goal: *Absence of Falls  Description: Document Boogie German Fall Risk and appropriate interventions in the flowsheet.   Outcome: Progressing Towards Goal  Note: Fall Risk Interventions:  Mobility Interventions: Patient to call before getting OOB, PT Consult for mobility concerns    Mentation Interventions: Adequate sleep, hydration, pain control, Update white board    Medication Interventions: Evaluate medications/consider consulting pharmacy, Patient to call before getting OOB, Teach patient to arise slowly, Utilize gait belt for transfers/ambulation    Elimination Interventions: Call light in reach              Problem: Patient Education: Go to Patient Education Activity  Goal: Patient/Family Education  Outcome: Progressing Towards Goal     Problem: Pain  Goal: *Control of Pain  Outcome: Progressing Towards Goal     Problem: Infection - Risk of, Urinary Catheter-Associated Urinary Tract Infection  Goal: *Absence of infection signs and symptoms  Outcome: Progressing Towards Goal     Problem: High Risk or History of INGRID  Goal: Recognition of INGRID or High Risk for INGRID  Outcome: Progressing Towards Goal  Goal: Maintain Patent Airway and Adequate Oxygenation  Outcome: Progressing Towards Goal  Goal: Avoid Over-sedation  Outcome: Progressing Towards Goal  Goal: Maintenance Care of INGRID  Outcome: Progressing Towards Goal     Problem: Patient Education: Go to Patient Education Activity  Goal: Patient/Family Education  Outcome: Progressing Towards Goal

## 2020-07-27 NOTE — PROGRESS NOTES
2058: Patient in bed awake,alert and oriented x4. No signs of distress. Bed low and locked. Call bell within reach. Will continue monitoring. 6997: In bed sleeping,no other conern at this time,call bell within reach. Will continue monitoring. Patient Vitals for the past 12 hrs:   Temp Pulse Resp BP SpO2   07/27/20 0803 98 °F (36.7 °C) 76 18 97/62 93 %   07/27/20 0002 98.3 °F (36.8 °C) 78 18 105/65 94 %     0700: Bedside and Verbal shift change report given to 2776 Josué Gabriel (oncoming nurse) by Talisha Doll (offgoing nurse). Report included the following information SBAR, Kardex, MAR and Recent Results.

## 2020-07-27 NOTE — PROGRESS NOTES
POD 3    Postoperative Diagnosis: m48.061       Procedure: Procedure(s):  Decompression l2-l3 right l2-l3 extend fusion to l2, posterolateral spine fusion l2-l3, transforminal interbody fusion l2-l3, and cage placement    HPI:   C/o back pain and muscle spasms  Some participation with PT  No CP, SOB or Palpitations  Tolerating diet  No N/V; No BM; bit of flatus  Voiding well      ACTIVE MEDICAL PROBLEMS/PMH/PSH    Patient Active Problem List   Diagnosis Code    Lumbar stenosis M48.061    HNP (herniated nucleus pulposus), lumbar M51.26     Paroxysmal atrial tachycardia   Palpitations  Premature ventricular contractions   GERD  Dpression     Echocardiogram 01/17/2017:  NORMAL GLOBAL LEFT VENTRICULAR SYSTOLIC FUNCTION WITH AN ESTIMATED EJECTION FRACTION OF  65%. NORMAL DIASTOLIC FUNCTION. NORMAL CHAMBER SIZES. MILD DIFFUSE THICKENING (SCLEROSIS) OF THE AORTIC VALVE CUSPS WITHOUT REDUCED EXCURSION. NO HEMODYNAMICALLY SIGNIFICANT VALVULAR PATHOLOGY. NORMAL PULMONARY ARTERY PRESSURE OF 26 MMHG. NO PREVIOUS REPORT FOR COMPARISON.       Cardiac Event Monitor 06/18/19- report not available    PMH:  Past Medical History:   Diagnosis Date    Adverse effect of anesthesia     Pt. awakens screaming and flailing    Hypercholesteremia     Paroxysmal atrioventricular tachycardia (HCC)     Sleep apnea     C PAP     OU cataracts    PSH:  Post lumbar spine decompression and fusion for lumbar spine stenosis and spondylosis 04/05/18    Past Surgical History:   Procedure Laterality Date    HX HYSTERECTOMY      HX LUMBAR DISKECTOMY      HX LUMBAR DISKECTOMY      HX LUMBAR FUSION      HX ORTHOPAEDIC      Ulnar nrve revision    HX ORTHOPAEDIC      Brachial nerve revision    HX ORTHOPAEDIC Right     trigger finer release       PTA MEDICATIONS  Medications Prior to Admission   Medication Sig    atorvastatin (LIPITOR) 10 mg tablet Take 10 mg by mouth At bedtime.     metoprolol succinate (TOPROL-XL) 50 mg XL tablet Take 50 mg by mouth daily.  escitalopram oxalate (LEXAPRO) 20 mg tablet Take 20 mg by mouth daily. ALLERGIES:    Allergies   Allergen Reactions    Shellfish Derived Anaphylaxis        FAMILY HISTORY   Father:  dementia 79 y/o  Mother: living 80 [facility]  Brothers: one One Arch Bari  Sisters : two One Arch Bari  Children: one     History reviewed. No pertinent family history.     SOCIAL HISTORY  Marital Status:  lives with spouse  Education Completed:  Occupation: NP  Tobacco use: never  Alcohol use: no  Drug use: no  STI: no  Living will: no  POA: no    Social History     Socioeconomic History    Marital status:      Spouse name: Not on file    Number of children: Not on file    Years of education: Not on file    Highest education level: Not on file   Occupational History    Not on file   Social Needs    Financial resource strain: Not on file    Food insecurity     Worry: Not on file     Inability: Not on file   [employer] needs     Medical: Not on file     Non-medical: Not on file   Tobacco Use    Smoking status: Never Smoker    Smokeless tobacco: Never Used   Substance and Sexual Activity    Alcohol use: No    Drug use: No    Sexual activity: Not on file   Lifestyle    Physical activity     Days per week: Not on file     Minutes per session: Not on file    Stress: Not on file   Relationships    Social connections     Talks on phone: Not on file     Gets together: Not on file     Attends Sikhism service: Not on file     Active member of club or organization: Not on file     Attends meetings of clubs or organizations: Not on file     Relationship status: Not on file    Intimate partner violence     Fear of current or ex partner: Not on file     Emotionally abused: Not on file     Physically abused: Not on file     Forced sexual activity: Not on file   Other Topics Concern    Not on file   Social History Narrative    Not on file       ROS:  Constitutional: No headache  Cardiac: HPI  Respiratory: No cough   GI:. HPI  : HPI  Skin: No rash. No itching      Physical Exam:      Visit Vitals  /68 (BP 1 Location: Right arm, BP Patient Position: At rest)   Pulse 87   Temp 97.9 °F (36.6 °C)   Resp 18   Ht 5' 3\" (1.6 m)   Wt 83.6 kg (184 lb 4 oz)   SpO2 90%   BMI 32.64 kg/m²     GENERAL: Lying in bed c/o muscles spasm   HEENT:    LUNGS: CTA. BS Normal Bilaterally  HEART: RRR. No murmur. ABDOMEN: Soft. Normal BS. No tenderness. LE: No edema. SCD in place  PULSES: Radial 2+; PT 2+      Dressing clean and dry    NEUROLOGIC:     Muscle Strength  RLE: Quads AT GCS 5/5  LLE: Quads AT GCS 5/5     Labs Reviewed:    No results found for this or any previous visit (from the past 24 hour(s)).     ASSESSMENT  Post uneventful L/S decompression and fusion-stable  Post op pain  Post op muscle spasms  BP low normal  at base line -asymptomatic  Post op anemia- today's H/H 9.7/31.2- H/H basically stable as of yesterday; 07/24/20 H/H 12.5/38.7  PAT- asymptomatic  GERD   Obesity  Leukocytosis  resolved    PLAN  Valium now   Decrease dilaudid due to low BP  D/C IVF  Continue other meds  Hopefully home in AM    Discussed with nursing staff    Jamaica Bird MD     7/27/2020, 1;39 PM    CELL [phone]

## 2020-07-27 NOTE — ROUTINE PROCESS
Bedside shiftn report received from [facility] with sbar Valium given for back spasm 
oob with physio Percocet given for pain 
oob up in chair Back to bed with help Valium given for back spasm Dr. Chapo Torres in to

## 2020-07-27 NOTE — PROGRESS NOTES
Ortho Note POD 3  S: pt states still with back pain and does not feel comfortable going home today  O: AF,VSS  5/5 motor strength bilateral hip flexors, quads and hamstrings, tibialis anterior and plantarflexors. Sensation to touch intact all surfaces of the bilateral foot and lower legs. Assessment  Status post L2-3 decompression fusion        Plan   1. Mobilize with PT   2. TLSO for ambulation  3. Pain control; hypotensive, limits pain med options   4. Appreciate IM recs  5.  Plan to d/c homeTuesday     Lilli Burns MD

## 2020-07-27 NOTE — PROGRESS NOTES
Problem: Mobility Impaired (Adult and Pediatric)  Goal: *Acute Goals and Plan of Care (Insert Text)  Description: Physical Therapy Goals  Initiated 7/25/2020 and to be accomplished within 7 day(s)  1. Patient will move from supine to sit and sit to supine  in bed with modified independence. 2.  Patient will transfer from bed to chair and chair to bed with modified independence using the least restrictive device. 3.  Patient will perform sit to stand with modified independence. 4.  Patient will ambulate with modified independence for 50 feet with the least restrictive device. 5.  Patient will ascend/descend 5 stairs with handrail(s) with supervision/set-up. Outcome: Progressing Towards Goal   PHYSICAL THERAPY TREATMENT    Patient: Naomie Saint (23 y.o. female)  Date: 7/27/2020  Diagnosis: HNP (herniated nucleus pulposus), lumbar [M51.26]   Procedure(s) (LRB):  Decompression l2-l3 right l2-l3 extend fusion to l2, posterolateral spine fusion l2-l3, transforminal interbody fusion l2-l3, and cage placement (N/A) 3 Days Post-Op  Precautions: Fall, Spinal  PLOF: Independent  ASSESSMENT:  Agreeable to mobility s/p valium. Refused lumbar brace. Min A for supine to sit via logroll. Seated EOB with intact balance. Supervision for sit to stand with use of bed elevation to elevate patient to standing. Amb 12ft from the bed to restroom with ww with min A. Decreased step length and speed. Attempted sit to stand to elevated toilet seated with supervision; completed 3 times. Each time upon sitting; screaming in pain; reports spasms and immediately return to standing. Hysterical crying in standing 3-4 minutes. Cues for breathing; once breathing slowed would repeat. Agreeable to trial amb to calm spasms. Amb 30ft with ww and supervision. Returned to restroom and completed sit to stand, voided, and returned to standing with supervision. Amb 20 ft with ww and supervision for chair. Seated in chair with supervision.  RN Martha Levy present at end of session. Educated on need for RN assistance with mobility; verbalized understanding. Call bell in reach. Progression toward goals:   []      Improving appropriately and progressing toward goals  [x]      Improving slowly and progressing toward goals  []      Not making progress toward goals and plan of care will be adjusted     PLAN:  Patient continues to benefit from skilled intervention to address the above impairments. Continue treatment per established plan of care. Discharge Recommendations:  Home Health  Further Equipment Recommendations for Discharge:  brace/splint and rolling walker     SUBJECTIVE:   Patient stated You don't understand.     OBJECTIVE DATA SUMMARY:   Critical Behavior:  Neurologic State: Alert  Orientation Level: Oriented X4  Cognition: Follows commands     Psychosocial  Patient Behaviors: Demanding    Functional Mobility:  Bed Mobility:  Supine to Sit: Minimum assistance  Transfers:  Sit to Stand: Supervision  Stand to Sit: Supervision  Balance:   Sitting: Impaired  Sitting - Static: Good (unsupported)  Sitting - Dynamic: Good (unsupported)  Standing: Impaired  Standing - Static: Good  Standing - Dynamic : Good  Ambulation/Gait Training:  Distance (ft): (12, 30, 20)   Assistive Device: Walker, rolling  Ambulation - Level of Assistance: Supervision  Neuro Re-Education:  Standing balance; 30 minutes  Therapeutic Exercises:   Sit to stand x5    Pain:  Pain level pre-treatment: 9/10  Pain level post-treatment: 9/10     Activity Tolerance:   Fair    After treatment:   [x] Patient left in no apparent distress sitting up in chair  [] Patient left in no apparent distress in bed  [x] Call bell left within reach  [x] Nursing notified  [] Caregiver present  [] Bed alarm activated  [] SCDs applied      COMMUNICATION/EDUCATION:   [x]         Role of physical therapy in the acute care setting.   [x]         Fall prevention education was provided and the patient/caregiver indicated understanding. [x]         Patient/family have participated as able in working toward goals and plan of care. [x]         Patient/family agree to work toward stated goals and plan of care. []         Patient understands intent and goals of therapy, but is neutral about his/her participation. []         Patient is unable to participate in stated goals/plan of care: ongoing with therapy staff.       Sabrina Rhodes, PT   Time Calculation: 48 mins

## 2020-07-28 PROCEDURE — 74011250637 HC RX REV CODE- 250/637: Performed by: INTERNAL MEDICINE

## 2020-07-28 PROCEDURE — 65270000029 HC RM PRIVATE

## 2020-07-28 PROCEDURE — 74011250636 HC RX REV CODE- 250/636: Performed by: INTERNAL MEDICINE

## 2020-07-28 RX ADMIN — ATORVASTATIN CALCIUM 10 MG: 10 TABLET, FILM COATED ORAL at 21:57

## 2020-07-28 RX ADMIN — OXYCODONE HYDROCHLORIDE AND ACETAMINOPHEN 1 TABLET: 10; 325 TABLET ORAL at 07:49

## 2020-07-28 RX ADMIN — DIAZEPAM 5 MG: 5 TABLET ORAL at 16:01

## 2020-07-28 RX ADMIN — DIAZEPAM 5 MG: 5 TABLET ORAL at 07:49

## 2020-07-28 RX ADMIN — PANTOPRAZOLE SODIUM 40 MG: 40 TABLET, DELAYED RELEASE ORAL at 07:49

## 2020-07-28 RX ADMIN — METOPROLOL SUCCINATE 25 MG: 25 TABLET, EXTENDED RELEASE ORAL at 09:13

## 2020-07-28 RX ADMIN — SODIUM CHLORIDE 10 ML: 9 INJECTION, SOLUTION INTRAMUSCULAR; INTRAVENOUS; SUBCUTANEOUS at 21:57

## 2020-07-28 RX ADMIN — DOCUSATE SODIUM 100 MG: 100 CAPSULE, LIQUID FILLED ORAL at 09:13

## 2020-07-28 RX ADMIN — DOCUSATE SODIUM 100 MG: 100 CAPSULE, LIQUID FILLED ORAL at 17:48

## 2020-07-28 RX ADMIN — ESCITALOPRAM OXALATE 20 MG: 20 TABLET ORAL at 09:13

## 2020-07-28 RX ADMIN — OXYCODONE HYDROCHLORIDE AND ACETAMINOPHEN 2 TABLET: 10; 325 TABLET ORAL at 21:57

## 2020-07-28 RX ADMIN — OXYCODONE HYDROCHLORIDE AND ACETAMINOPHEN 2 TABLET: 10; 325 TABLET ORAL at 17:46

## 2020-07-28 RX ADMIN — DIAZEPAM 5 MG: 5 TABLET ORAL at 21:57

## 2020-07-28 RX ADMIN — SODIUM CHLORIDE 10 ML: 9 INJECTION, SOLUTION INTRAMUSCULAR; INTRAVENOUS; SUBCUTANEOUS at 06:00

## 2020-07-28 RX ADMIN — HYDROMORPHONE HYDROCHLORIDE 0.5 MG: 1 INJECTION, SOLUTION INTRAMUSCULAR; INTRAVENOUS; SUBCUTANEOUS at 09:18

## 2020-07-28 NOTE — PROGRESS NOTES
POD 4    Postoperative Diagnosis: m48.061       Procedure: Procedure(s):  Decompression l2-l3 right l2-l3 extend fusion to l2, posterolateral spine fusion l2-l3, transforminal interbody fusion l2-l3, and cage placement    HPI:   C/o of Lumbar  muscle spasms earlier; presently no discomfort  Post op pain- prefers dilaudid IV over percocet  No CP, SOB or Palpitations  Tolerating diet  No N/V; No BM; passing flatus  Voiding well      ACTIVE MEDICAL PROBLEMS/PMH/PSH    Patient Active Problem List   Diagnosis Code    Lumbar stenosis M48.061    HNP (herniated nucleus pulposus), lumbar M51.26     Paroxysmal atrial tachycardia   Palpitations  Premature ventricular contractions   GERD  Dpression     Echocardiogram 01/17/2017:  NORMAL GLOBAL LEFT VENTRICULAR SYSTOLIC FUNCTION WITH AN ESTIMATED EJECTION FRACTION OF  65%. NORMAL DIASTOLIC FUNCTION. NORMAL CHAMBER SIZES. MILD DIFFUSE THICKENING (SCLEROSIS) OF THE AORTIC VALVE CUSPS WITHOUT REDUCED EXCURSION. NO HEMODYNAMICALLY SIGNIFICANT VALVULAR PATHOLOGY. NORMAL PULMONARY ARTERY PRESSURE OF 26 MMHG. NO PREVIOUS REPORT FOR COMPARISON.       Cardiac Event Monitor 06/18/19- report not available    PMH:  Past Medical History:   Diagnosis Date    Adverse effect of anesthesia     Pt. awakens screaming and flailing    Hypercholesteremia     Paroxysmal atrioventricular tachycardia (HCC)     Sleep apnea     C PAP     OU cataracts    PSH:  Post lumbar spine decompression and fusion for lumbar spine stenosis and spondylosis 04/05/18    Past Surgical History:   Procedure Laterality Date    HX HYSTERECTOMY      HX LUMBAR DISKECTOMY      HX LUMBAR DISKECTOMY      HX LUMBAR FUSION      HX ORTHOPAEDIC      Ulnar nrve revision    HX ORTHOPAEDIC      Brachial nerve revision    HX ORTHOPAEDIC Right     trigger finer release       PTA MEDICATIONS  Medications Prior to Admission   Medication Sig    atorvastatin (LIPITOR) 10 mg tablet Take 10 mg by mouth At bedtime.     metoprolol succinate (TOPROL-XL) 50 mg XL tablet Take 50 mg by mouth daily.  escitalopram oxalate (LEXAPRO) 20 mg tablet Take 20 mg by mouth daily. ALLERGIES:    Allergies   Allergen Reactions    Shellfish Derived Anaphylaxis        FAMILY HISTORY   Father:  dementia 79 y/o  Mother: living 80 [facility]  Brothers: one One Arch Bari  Sisters : two One Arch Bari  Children: one     History reviewed. No pertinent family history.     SOCIAL HISTORY  Marital Status:  lives with spouse  Education Completed:  Occupation: NP  Tobacco use: never  Alcohol use: no  Drug use: no  STI: no  Living will: no  POA: no    Social History     Socioeconomic History    Marital status:      Spouse name: Not on file    Number of children: Not on file    Years of education: Not on file    Highest education level: Not on file   Occupational History    Not on file   Social Needs    Financial resource strain: Not on file    Food insecurity     Worry: Not on file     Inability: Not on file   [employer] needs     Medical: Not on file     Non-medical: Not on file   Tobacco Use    Smoking status: Never Smoker    Smokeless tobacco: Never Used   Substance and Sexual Activity    Alcohol use: No    Drug use: No    Sexual activity: Not on file   Lifestyle    Physical activity     Days per week: Not on file     Minutes per session: Not on file    Stress: Not on file   Relationships    Social connections     Talks on phone: Not on file     Gets together: Not on file     Attends Taoist service: Not on file     Active member of club or organization: Not on file     Attends meetings of clubs or organizations: Not on file     Relationship status: Not on file    Intimate partner violence     Fear of current or ex partner: Not on file     Emotionally abused: Not on file     Physically abused: Not on file     Forced sexual activity: Not on file   Other Topics Concern    Not on file   Social History Narrative    Not on file ROS:  Constitutional: No headache  Cardiac: HPI  Respiratory: No cough   GI:. HPI  : HPI  Skin: No rash. No itching      Physical Exam:      Visit Vitals  /66 (BP 1 Location: Right arm, BP Patient Position: At rest)   Pulse 81   Temp 98.7 °F (37.1 °C)   Resp 18   Ht 5' 3\" (1.6 m)   Wt 83.6 kg (184 lb 4 oz)   SpO2 93%   BMI 32.64 kg/m²     GENERAL: Lying in bed c/o muscles spasm   HEENT:    LUNGS: CTA. BS Normal Bilaterally  HEART: RRR. No murmur. ABDOMEN: Soft. Normal BS. No tenderness. LE: No edema. SCD in place  PULSES: Radial 2+; PT 2+      Dressing clean and dry    NEUROLOGIC:     Muscle Strength  RLE: Quads AT GCS 5/5  LLE: Quads AT GCS 5/5     Labs Reviewed:    No results found for this or any previous visit (from the past 24 hour(s)).     ASSESSMENT  Post uneventful L/S decompression and fusion-stable  Post op pain  Post op muscle spasms  BP normal   Post op anemia- today's H/H 9.7/31.2- H/H basically stable as of yesterday; 07/24/20 H/H 12.5/38.7  PAT- asymptomatic  GERD   Obesity  Leukocytosis  resolved    PLAN  Continue same meds and POC  Medically stable   Hopefully Home in [place]     7/28/2020, 1;39 PM    CELL [phone]

## 2020-07-28 NOTE — PROGRESS NOTES
Problem: Discharge Planning  Goal: *Discharge to safe environment  Outcome: Resolved/Met   Plan home    Chart reviewed. Patient is c/o back spasms per chart- MD is to dc her tomorrow. She is to borrow a friends walker for dc. Jacki Mosquera.  CHRISTINA Orantes  [facility]  [phone], Pager [phone]  [email or web address]

## 2020-07-28 NOTE — PROGRESS NOTES
1031: Attempted PT session. Awakens to voice. Declines mobility; reports \"I'm catching up on the sleep I've missed today. \" Agreeable to PT following up this PM.   2874: 2nd PT attempt. Seated in recliner drinking cranberry juice. Declines amb at this time d/t not needing the bathroom and wanting to finish her juice. Educated on importance of mobility; verbalized understanding. Will follow up as available. 1400: 3rd PT attempt. Remains in chair. On video call with family; declines mobility. Encouraged amb with nursing later today. Will follow up.

## 2020-07-28 NOTE — PROGRESS NOTES
0900  Pt still in pain with Percoset and Valium, was given IV Dilaudid, was  Able to sleep. 1200  Able to move and work with PT, per pt IV Dilaudid really help her, sittin john the recliner, pt said next time she wants all the time now Dilaudid IV, explained to her she needs to  Have the Percoset  And Valium also, IV Dilaudid is only  for severe pain, explained about oral pain med  before discharge, verbalized understanding. 1430  Up  Sitting on the recliner,offered pain Percoset  But refused at this time, she said pain is less at this time. 1550  Offered pain meds but does not want at this time, offered Valium which she did agreed, assisted to the bathroom, voided back to bed, back dressing dry and intact, moving all extremities. 1800  This time she was not  calling for pain med, she said it is bearable but offered her already Percoset, 2 tablets given. Resting at this time, denies numbness and tingling, back dressing dry and intact.

## 2020-07-28 NOTE — PROGRESS NOTES
Problem: Falls - Risk of  Goal: *Absence of Falls  Description: Document Ivar Du Fall Risk and appropriate interventions in the flowsheet.   Outcome: Progressing Towards Goal  Note: Fall Risk Interventions:  Mobility Interventions: Patient to call before getting OOB, PT Consult for mobility concerns, PT Consult for assist device competence    Mentation Interventions: Door open when patient unattended, Update white board    Medication Interventions: Teach patient to arise slowly, Patient to call before getting OOB    Elimination Interventions: Call light in reach, Stay With Me (per policy), Patient to call for help with toileting needs              Problem: Patient Education: Go to Patient Education Activity  Goal: Patient/Family Education  Outcome: Progressing Towards Goal     Problem: Pain  Goal: *Control of Pain  Outcome: Progressing Towards Goal     Problem: High Risk or History of INGRID  Goal: Recognition of INGRID or High Risk for INGRID  Outcome: Progressing Towards Goal  Goal: Maintain Patent Airway and Adequate Oxygenation  Outcome: Progressing Towards Goal  Goal: Avoid Over-sedation  Outcome: Progressing Towards Goal  Goal: Maintenance Care of INGRID  Outcome: Progressing Towards Goal     Problem: Patient Education: Go to Patient Education Activity  Goal: Patient/Family Education  Outcome: Progressing Towards Goal

## 2020-07-28 NOTE — PROGRESS NOTES
Orthopaedics    Patient without new complaints status post LAMINEC/FACETECT/FORAMIN,LUMBAR [22115] (SPINE LUMBAR POSTERIOR INTERBODY FUSION (PLIF))  HI LAMNOTMY INCL W/DCMPRSN NRV ROOT 1 INTRSPC LUMBR [44737]  SPINE FIXATION,POST,PLUMMER [20840]  HI INSJ BIOMCHN DEV INTERVERTEBRAL DSC SPC W/ARTHRD [20450]  HI ARTHRODESIS POSTERIOR/POSTEROLATERAL LUMBAR [22612]  HI ARTHDSIS POST/POSTEROLATRL/POSTINTERBODY LUMBAR [77448]  HI ALLOGRAFT FOR SPINE SURGERY ONLY STRUCTURAL [20931] for HNP (herniated nucleus pulposus), lumbar [M51.26] 7/24/2020. Complaining of lumbar spasm. No LEP. Voiding. Visit Vitals  /65 (BP 1 Location: Right arm, BP Patient Position: At rest)   Pulse 85   Temp 98.8 °F (37.1 °C)   Resp 20   Ht 5' 3\" (1.6 m)   Wt 83.6 kg (184 lb 4 oz)   SpO2 94%   BMI 32.64 kg/m²       CBC w/Diff    Lab Results   Component Value Date/Time    WBC 11.7 07/26/2020 12:47 PM    RBC 3.18 (L) 07/26/2020 12:47 PM    HCT 31.2 (L) 07/26/2020 12:47 PM    MCV 98.1 (H) 07/26/2020 12:47 PM    MCH 30.5 07/26/2020 12:47 PM    MCHC 31.1 07/26/2020 12:47 PM    RDW 14.5 07/26/2020 12:47 PM    Lab Results   Component Value Date/Time    MONOS 10 07/26/2020 12:47 PM    EOS 1 07/26/2020 12:47 PM    BASOS 0 07/26/2020 12:47 PM    RDW 14.5 07/26/2020 12:47 PM          Physical exam:  Dressing dry. AT, GS intact  Bilateral Lower Extremities. Calves soft and nontender. Assessment:  Status post LAMINEC/FACETECT/FORAMIN,LUMBAR [49101] (SPINE LUMBAR POSTERIOR INTERBODY FUSION (PLIF))  HI LAMNOTMY INCL W/DCMPRSN NRV ROOT 1 INTRSPC LUMBR [53576]  SPINE FIXATION,POST,PLUMMER [05575]  HI INSJ BIOMCHN DEV INTERVERTEBRAL DSC SPC W/ARTHRD [28347]  HI ARTHRODESIS POSTERIOR/POSTEROLATERAL LUMBAR [75823]  HI ARTHDSIS POST/POSTEROLATRL/POSTINTERBODY LUMBAR [34982]  HI ALLOGRAFT FOR SPINE SURGERY ONLY STRUCTURAL [85034] for HNP (herniated nucleus pulposus), lumbar [M51.26] 7/24/2020,  progressing. PLAN:  Hold disch today. valium for spasm  Mobilize. Continue P.T. Discharge Planning. Home tomorrow.     Desmond Coburn MD  July 28, 2020

## 2020-07-28 NOTE — ROUTINE PROCESS
Bedside and Verbal shift change report given to Anaya Officer, RN, BSN (oncoming nurse) by Orin Fitzpatrick RN, BSN (offgoing nurse). Report included the following information SBAR, Kardex, OR Summary, Procedure Summary, Intake/Output, MAR and Recent Results. Rounded with MD.  Patient yelling out. Medicated with Percocet 10/325 mg, Valium 5 mg, and Protonix 40 mg.    
 
Orin Fitzpatrick RN, BSN

## 2020-07-28 NOTE — ROUTINE PROCESS
Bedside and Verbal shift change report given to Manuela Simmons (oncoming nurse) by Ro Lambert RN 
 (offgoing nurse). Report included the following information SBAR, Kardex and Intake/Output.

## 2020-07-29 VITALS
RESPIRATION RATE: 18 BRPM | HEIGHT: 63 IN | TEMPERATURE: 98.2 F | WEIGHT: 184.25 LBS | BODY MASS INDEX: 32.64 KG/M2 | DIASTOLIC BLOOD PRESSURE: 70 MMHG | HEART RATE: 65 BPM | SYSTOLIC BLOOD PRESSURE: 112 MMHG | OXYGEN SATURATION: 93 %

## 2020-07-29 PROCEDURE — 74011250637 HC RX REV CODE- 250/637: Performed by: INTERNAL MEDICINE

## 2020-07-29 RX ADMIN — DOCUSATE SODIUM 100 MG: 100 CAPSULE, LIQUID FILLED ORAL at 08:33

## 2020-07-29 RX ADMIN — SODIUM CHLORIDE 10 ML: 9 INJECTION, SOLUTION INTRAMUSCULAR; INTRAVENOUS; SUBCUTANEOUS at 06:06

## 2020-07-29 RX ADMIN — METOPROLOL SUCCINATE 25 MG: 25 TABLET, EXTENDED RELEASE ORAL at 08:33

## 2020-07-29 RX ADMIN — OXYCODONE HYDROCHLORIDE AND ACETAMINOPHEN 2 TABLET: 10; 325 TABLET ORAL at 08:32

## 2020-07-29 RX ADMIN — ESCITALOPRAM OXALATE 20 MG: 20 TABLET ORAL at 08:33

## 2020-07-29 RX ADMIN — OXYCODONE HYDROCHLORIDE AND ACETAMINOPHEN 1 TABLET: 10; 325 TABLET ORAL at 15:47

## 2020-07-29 RX ADMIN — PANTOPRAZOLE SODIUM 40 MG: 40 TABLET, DELAYED RELEASE ORAL at 08:33

## 2020-07-29 RX ADMIN — DIAZEPAM 5 MG: 5 TABLET ORAL at 16:40

## 2020-07-29 NOTE — PROGRESS NOTES
7448: Attempted PT session. Discharge orders in chart. Seated in recliner on video call. Declines amb with PT d/t bloating. Educated on benefits of mobility and intestinal relief; verbalizes understanding but continues to decline. 1333: 2nd PT attempt. Patient lying in bed sleeping. Does not arouse to light, knock, or voice. Will follow up.

## 2020-07-29 NOTE — DISCHARGE INSTRUCTIONS
DISCHARGE SUMMARY from Nurse    PATIENT INSTRUCTIONS:    After general anesthesia or intravenous sedation, for 24 hours or while taking prescription Narcotics:  · Limit your activities  · Do not drive and operate hazardous machinery  · Do not make important personal or business decisions  · Do  not drink alcoholic beverages  · If you have not urinated within 8 hours after discharge, please contact your surgeon on call. Report the following to your surgeon:  · Excessive pain, swelling, redness or odor of or around the surgical area  · Temperature over 100.5  · Nausea and vomiting lasting longer than 4 hours or if unable to take medications  · Any signs of decreased circulation or nerve impairment to extremity: change in color, persistent  numbness, tingling, coldness or increase pain  · Any questions    What to do at Home:  Recommended activity: Activity as tolerated and no driving for today and Ambulate in house,     If you experience any of the following symptoms like increasing pain  , please follow up with Dr Jairo Morrow  . *  Please give a list of your current medications to your Primary Care Provider. *  Please update this list whenever your medications are discontinued, doses are      changed, or new medications (including over-the-counter products) are added. *  Please carry medication information at all times in case of emergency situations. These are general instructions for a healthy lifestyle:    No smoking/ No tobacco products/ Avoid exposure to second hand smoke  Surgeon General's Warning:  Quitting smoking now greatly reduces serious risk to your health.     Obesity, smoking, and sedentary lifestyle greatly increases your risk for illness    A healthy diet, regular physical exercise & weight monitoring are important for maintaining a healthy lifestyle    You may be retaining fluid if you have a history of heart failure or if you experience any of the following symptoms:  Weight gain of 3 pounds or more overnight or 5 pounds in a week, increased swelling in our hands or feet or shortness of breath while lying flat in bed. Please call your doctor as soon as you notice any of these symptoms; do not wait until your next office visit. The discharge information has been reviewed with the {PATIENT PARENT GUARDIAN:50924}. The {PATIENT PARENT GUARDIAN:99362} verbalized understanding. Discharge medications reviewed with the {Dishcar meds reviewed GNAE:26404} and appropriate educational materials and side effects teaching were provided.   ___________________________________________________________________________________________________________________________________

## 2020-07-29 NOTE — PROGRESS NOTES
0800  Received pt on bed, written on the board with oral pain med, explained about wearing back brace every time  She is up, she said  It hurts when she  Wears and prefer not to wear it,. Explained to her it is a must to wear back brace when sitting on the chair specially if more than 30 minutes. Explained okay not to wear if she goes to the bathroom to void for only few minutes and back to bed, was not happy about it. Again explained to wear back brace when up walking and sitting on the chair. 1100  Assisted by Pj Oneil to the bathroom, denies any tingling and numbness of extremities. Dr Merle Bryson  been notified about  Pt being discharge today, per MD he is coming to see her. 1400  Pain is less so far but pt at times she said pain  Is on and off. Dr Merle Bryson notified, spoke to his nurse. 0  Dr Merle Bryson came to see pt okay to go home, told pt to call her ride, given Percoset prior to discharge. 1641  Given Valium prior to discharge, pain less after Percoset, discharge instructions given, incisional pain given,  Verbalized understanding, incisional dressing dry and intact, discharge home.

## 2020-07-29 NOTE — PROGRESS NOTES
Problem: Discharge Planning  Goal: *Discharge to safe environment  Outcome: Resolved/Met   Plan home    dc'd home,no services ordered. Pt has refused to work with therapy 3x. Care Management Interventions  PCP Verified by CM: Yes  Palliative Care Criteria Met (RRAT>21 & CHF Dx)?: No  Mode of Transport at Discharge:  Other (see comment)  Discharge Durable Medical Equipment: No  Physical Therapy Consult: No  Occupational Therapy Consult: No  Speech Therapy Consult: No  Current Support Network: Lives with Spouse  Confirm Follow Up Transport: Family  The Patient and/or Patient Representative was Provided with a Choice of Provider and Agrees with the Discharge Plan?: No  Freedom of Choice List was Provided with Basic Dialogue that Supports the Patient's Individualized Plan of Care/Goals, Treatment Preferences and Shares the Quality Data Associated with the Providers?: No  Discharge Location  Discharge Placement: Home

## 2020-07-29 NOTE — DISCHARGE SUMMARY
Orthopaedics    Patient without complaints status post LAMINEC/FACETECT/FORAMIN,LUMBAR [00591] (SPINE LUMBAR POSTERIOR INTERBODY FUSION (PLIF))  FL LAMNOTMY INCL W/DCMPRSN NRV ROOT 1 INTRSPC LUMBR [52955]  SPINE FIXATION,POST,PLUMMER [47187]  FL INSJ BIOMCHN DEV INTERVERTEBRAL DSC SPC W/ARTHRD [53391]  FL ARTHRODESIS POSTERIOR/POSTEROLATERAL LUMBAR [22612]  FL ARTHDSIS POST/POSTEROLATRL/POSTINTERBODY LUMBAR [57130]  FL ALLOGRAFT FOR SPINE SURGERY ONLY STRUCTURAL [88616] for HNP (herniated nucleus pulposus), lumbar [M51.26] 7/24/2020. No complications during hospital stay and cleared for discharge home. Pt notes decreased lower extremity pain. Is voiding and passing gas. Wants to go home. Past Medical History:   Diagnosis Date    Adverse effect of anesthesia     Pt. awakens screaming and flailing    Hypercholesteremia     Paroxysmal atrioventricular tachycardia (HCC)     Sleep apnea     C PAP       Visit Vitals  /72 (BP 1 Location: Left arm)   Pulse 82   Temp 98.1 °F (36.7 °C)   Resp 18   Ht 5' 3\" (1.6 m)   Wt 83.6 kg (184 lb 4 oz)   SpO2 96%   BMI 32.64 kg/m²       CBC w/Diff    Lab Results   Component Value Date/Time    WBC 11.7 07/26/2020 12:47 PM    RBC 3.18 (L) 07/26/2020 12:47 PM    HCT 31.2 (L) 07/26/2020 12:47 PM    MCV 98.1 (H) 07/26/2020 12:47 PM    MCH 30.5 07/26/2020 12:47 PM    MCHC 31.1 07/26/2020 12:47 PM    RDW 14.5 07/26/2020 12:47 PM    Lab Results   Component Value Date/Time    MONOS 10 07/26/2020 12:47 PM    EOS 1 07/26/2020 12:47 PM    BASOS 0 07/26/2020 12:47 PM    RDW 14.5 07/26/2020 12:47 PM        Current Discharge Medication List      CONTINUE these medications which have NOT CHANGED    Details   atorvastatin (LIPITOR) 10 mg tablet Take 10 mg by mouth At bedtime. metoprolol succinate (TOPROL-XL) 50 mg XL tablet Take 50 mg by mouth daily. escitalopram oxalate (LEXAPRO) 20 mg tablet Take 20 mg by mouth daily. Physical exam:  Dressing dry.   AT, GS intact Bilateral Lower Extremities. Calves soft and nontender. Assessment:  Status post LAMINEC/FACETECT/FORAMIN,LUMBAR [86898] (SPINE LUMBAR POSTERIOR INTERBODY FUSION (PLIF))  OR LAMNOTMY INCL W/DCMPRSN NRV ROOT 1 INTRSPC LUMBR [60046]  SPINE FIXATION,POST,PLUMMER [29465]  OR INSJ BIOMCHN DEV INTERVERTEBRAL DSC SPC W/ARTHRD [23982]  OR ARTHRODESIS POSTERIOR/POSTEROLATERAL LUMBAR [22612]  OR ARTHDSIS POST/POSTEROLATRL/POSTINTERBODY LUMBAR [35848]  OR ALLOGRAFT FOR SPINE SURGERY ONLY STRUCTURAL [60129] for HNP (herniated nucleus pulposus), lumbar [M51.26],  doing well. PLAN:  Discharge Home. Followup in the office in 1 mo.percocet prn pain has been exscribed . Ambulate with TLSO.     Ras Hylton MD  July 29, 2020

## 2020-07-29 NOTE — PROGRESS NOTES
POD 5    Postoperative Diagnosis: m48.061       Procedure: Procedure(s):  Decompression l2-l3 right l2-l3 extend fusion to l2, posterolateral spine fusion l2-l3, transforminal interbody fusion l2-l3, and cage placement    HPI:   Uneventful last 24 hrs  Post op pain and muscle spasms controlled  No CP, SOB or Palpitations  Tolerating diet  No N/V; No BM; passing flatus  Voiding well      ACTIVE MEDICAL PROBLEMS/PMH/PSH    Patient Active Problem List   Diagnosis Code    Lumbar stenosis M48.061    HNP (herniated nucleus pulposus), lumbar M51.26     Paroxysmal atrial tachycardia   Palpitations  Premature ventricular contractions   GERD  Dpression     Echocardiogram 01/17/2017:  NORMAL GLOBAL LEFT VENTRICULAR SYSTOLIC FUNCTION WITH AN ESTIMATED EJECTION FRACTION OF  65%. NORMAL DIASTOLIC FUNCTION. NORMAL CHAMBER SIZES. MILD DIFFUSE THICKENING (SCLEROSIS) OF THE AORTIC VALVE CUSPS WITHOUT REDUCED EXCURSION. NO HEMODYNAMICALLY SIGNIFICANT VALVULAR PATHOLOGY. NORMAL PULMONARY ARTERY PRESSURE OF 26 MMHG. NO PREVIOUS REPORT FOR COMPARISON.       Cardiac Event Monitor 06/18/19- report not available    PMH:  Past Medical History:   Diagnosis Date    Adverse effect of anesthesia     Pt. awakens screaming and flailing    Hypercholesteremia     Paroxysmal atrioventricular tachycardia (HCC)     Sleep apnea     C PAP     OU cataracts    PSH:  Post lumbar spine decompression and fusion for lumbar spine stenosis and spondylosis 04/05/18    Past Surgical History:   Procedure Laterality Date    HX HYSTERECTOMY      HX LUMBAR DISKECTOMY      HX LUMBAR DISKECTOMY      HX LUMBAR FUSION      HX ORTHOPAEDIC      Ulnar nrve revision    HX ORTHOPAEDIC      Brachial nerve revision    HX ORTHOPAEDIC Right     trigger finer release       PTA MEDICATIONS  Medications Prior to Admission   Medication Sig    atorvastatin (LIPITOR) 10 mg tablet Take 10 mg by mouth At bedtime.     metoprolol succinate (TOPROL-XL) 50 mg XL tablet Take 50 mg by mouth daily.  escitalopram oxalate (LEXAPRO) 20 mg tablet Take 20 mg by mouth daily. ALLERGIES:    Allergies   Allergen Reactions    Shellfish Derived Anaphylaxis        FAMILY HISTORY   Father:  dementia 79 y/o  Mother: living 80 [facility]  Brothers: one One Arch Bari  Sisters : two One Arch Bari  Children: one     History reviewed. No pertinent family history.     SOCIAL HISTORY  Marital Status:  lives with spouse  Education Completed:  Occupation: NP  Tobacco use: never  Alcohol use: no  Drug use: no  STI: no  Living will: no  POA: no    Social History     Socioeconomic History    Marital status:      Spouse name: Not on file    Number of children: Not on file    Years of education: Not on file    Highest education level: Not on file   Occupational History    Not on file   Social Needs    Financial resource strain: Not on file    Food insecurity     Worry: Not on file     Inability: Not on file   [employer] needs     Medical: Not on file     Non-medical: Not on file   Tobacco Use    Smoking status: Never Smoker    Smokeless tobacco: Never Used   Substance and Sexual Activity    Alcohol use: No    Drug use: No    Sexual activity: Not on file   Lifestyle    Physical activity     Days per week: Not on file     Minutes per session: Not on file    Stress: Not on file   Relationships    Social connections     Talks on phone: Not on file     Gets together: Not on file     Attends Gnosticist service: Not on file     Active member of club or organization: Not on file     Attends meetings of clubs or organizations: Not on file     Relationship status: Not on file    Intimate partner violence     Fear of current or ex partner: Not on file     Emotionally abused: Not on file     Physically abused: Not on file     Forced sexual activity: Not on file   Other Topics Concern    Not on file   Social History Narrative    Not on file       ROS:  Constitutional: No headache  Cardiac: HPI  Respiratory: No cough   GI:. HPI  : HPI  Skin: No rash. No itching      Physical Exam:      Visit Vitals  /70 (BP 1 Location: Left arm, BP Patient Position: At rest)   Pulse 65   Temp 98.2 °F (36.8 °C)   Resp 18   Ht 5' 3\" (1.6 m)   Wt 83.6 kg (184 lb 4 oz)   SpO2 93%   BMI 32.64 kg/m²     GENERAL: Lying comfortably in bed  HEENT:    LUNGS: CTA. BS Normal Bilaterally  HEART: RRR. No murmur. ABDOMEN: Soft. Normal BS. No tenderness. LE: No edema. SCD in place  PULSES: Radial 2+; PT 2+      Dressing clean and dry per nursing staff    NEUROLOGIC:     Muscle Strength  RLE: Quads AT GCS 5/5  LLE: Quads AT GCS 5/5     Labs Reviewed:    No results found for this or any previous visit (from the past 24 hour(s)).     ASSESSMENT  Post uneventful L/S decompression and fusion-stable  Post op pain  Post op muscle spasms  BP normal   Post op anemia- today's H/H 9.7/31.2- H/H basically stable as of 07/26/20  PAT- asymptomatic  GERD   Obesity  Leukocytosis  resolved    PLAN  Medically stable to D/C home    Resume metoprolol succinate ER 50 mg daily      Juan Rojo MD     7/29/2020, 1;39 PM    CELL [phone]

## 2020-07-29 NOTE — ROUTINE PROCESS
1930 Bedside and Verbal shift change report given to Chrissy Rust RN (oncoming nurse) by Giovanni Bryant RN (offgoing nurse). Report included the following information SBAR, Kardex, Intake/Output and MAR. 2030 Pt ambulated to bathroom. Steady gate. Complained of pain. Voiding well. 0000 Pt ambulated to bathroom. Gait steady, no pain complaints. Voiding well.  
 
0400 pt resting, no pain complaints. No distress. 0725 Bedside and Verbal shift change report given to Giovanni Bryant RN (oncoming nurse) by Chrissy Rust RN (offgoing nurse). Report included the following information SBAR, Kardex, Intake/Output and MAR.

## 2020-07-29 NOTE — PROGRESS NOTES
Problem: Falls - Risk of  Goal: *Absence of Falls  Description: Document Wilver Aguilar Fall Risk and appropriate interventions in the flowsheet.   Outcome: Progressing Towards Goal  Note: Fall Risk Interventions:  Mobility Interventions: Patient to call before getting OOB, Utilize walker, cane, or other assistive device    Mentation Interventions: Adequate sleep, hydration, pain control, Door open when patient unattended    Medication Interventions: Patient to call before getting OOB    Elimination Interventions: Call light in reach, Patient to call for help with toileting needs              Problem: Patient Education: Go to Patient Education Activity  Goal: Patient/Family Education  Outcome: Progressing Towards Goal     Problem: Pain  Goal: *Control of Pain  Outcome: Progressing Towards Goal     Problem: High Risk or History of INGRID  Goal: Recognition of INGRID or High Risk for INGRID  Outcome: Progressing Towards Goal  Goal: Maintain Patent Airway and Adequate Oxygenation  Outcome: Progressing Towards Goal  Goal: Avoid Over-sedation  Outcome: Progressing Towards Goal  Goal: Maintenance Care of INGRID  Outcome: Progressing Towards Goal     Problem: Patient Education: Go to Patient Education Activity  Goal: Patient/Family Education  Outcome: Progressing Towards Goal

## (undated) DEVICE — SOL IRRIGATION INJ NACL 0.9% 500ML BTL

## (undated) DEVICE — INTENDED FOR TISSUE SEPARATION, AND OTHER PROCEDURES THAT REQUIRE A SHARP SURGICAL BLADE TO PUNCTURE OR CUT.: Brand: BARD-PARKER ® CARBON RIB-BACK BLADES

## (undated) DEVICE — SUTURE COAT VCRL PC 5 PRECIS COSM CONVENTIONAL CUT PRIM 3 8 J823H

## (undated) DEVICE — GRAFT DELIVERY KIT

## (undated) DEVICE — GAUZE,SPONGE,8"X4",12PLY,XRAY,STRL,LF: Brand: MEDLINE

## (undated) DEVICE — SUTURE COAT VCRL SZ 0 L18IN ABSRB UD CTX L48MM 1/2 CIR J724D

## (undated) DEVICE — PREP SKN CHLRAPRP APL 26ML STR --

## (undated) DEVICE — PACKING 8004050 NEURAY 200PK 7X152MM: Brand: NEURAY ®

## (undated) DEVICE — STERILE LATEX POWDER-FREE SURGICAL GLOVESWITH NITRILE COATING: Brand: PROTEXIS

## (undated) DEVICE — NDL PRT INJ NSAF BLNT 18GX1.5 --

## (undated) DEVICE — SUTURE VCRL SZ 0 L36IN ABSRB UD L48MM CTX 1/2 CIR J978H

## (undated) DEVICE — SUTURE ABSORBABLE BRAIDED 2-0 CT-1 27 IN UD VICRYL J259H

## (undated) DEVICE — BIPOLAR FORCEPS CORD: Brand: VALLEYLAB

## (undated) DEVICE — SPONGE GZ W4XL4IN COT 12 PLY TYP VII WVN C FLD DSGN

## (undated) DEVICE — GRAFT BNE MAR ART BMC MED

## (undated) DEVICE — X-RAY SPONGES,12 PLY: Brand: DERMACEA

## (undated) DEVICE — 3M™ IOBAN™ 2 ANTIMICROBIAL INCISE DRAPE 6650EZ: Brand: IOBAN™ 2

## (undated) DEVICE — PAD ALCOHOL PREP LG --

## (undated) DEVICE — Device

## (undated) DEVICE — SPINE PACK DEPAUL: Brand: MEDLINE INDUSTRIES, INC.

## (undated) DEVICE — SUTURE VCRL SZ 4-0 L27IN ABSRB UD L19MM PS-2 3/8 CIR PRIM J426H

## (undated) DEVICE — TOOL 14MH30 LEGEND 14CM 3MM: Brand: MIDAS REX ™

## (undated) DEVICE — PACKING 8004051 NEURAY 200PK 13X152MM: Brand: NEURAY ®

## (undated) DEVICE — BRUSH SCRB PCMX NL CLN 12ML --

## (undated) DEVICE — PAD,ABDOMINAL,5"X9",STERILE,LF,1/PK: Brand: MEDLINE INDUSTRIES, INC.

## (undated) DEVICE — THE MILL DISPOSABLE - MEDIUM

## (undated) DEVICE — 12FR FRAZIER SUCTION HANDLE: Brand: CARDINAL HEALTH

## (undated) DEVICE — PAD PREP ALCOHOL LG STERILE -- CONVERT TO ITEM 305014

## (undated) DEVICE — SHEET,DRAPE,70X100,STERILE: Brand: MEDLINE

## (undated) DEVICE — MAYO STAND COVER: Brand: CONVERTORS

## (undated) DEVICE — ELECTRODE BLDE L4IN NONINSULATED EDGE

## (undated) DEVICE — TRAY CATH 16F URIN MTR LTX -- CONVERT TO ITEM 363111

## (undated) DEVICE — PREP SKN DURAPREP 26ML APPL --

## (undated) DEVICE — DRAPE,TOWEL,LARGE,INVISISHIELD: Brand: MEDLINE

## (undated) DEVICE — CATHETER IV 10GA L3IN OD3.3-3.5MM ID2.64-2.74MM BRN FEP

## (undated) DEVICE — BIPOLAR FORCEPS CORD,BANANA LEADS: Brand: VALLEYLAB

## (undated) DEVICE — TOWEL SURG W16XL26IN BLU NONFENESTRATED DLX ST 2 PER PK

## (undated) DEVICE — SYR LR LCK 1ML GRAD NSAF 30ML --

## (undated) DEVICE — REM POLYHESIVE ADULT PATIENT RETURN ELECTRODE: Brand: VALLEYLAB

## (undated) DEVICE — SYRINGE IRRIG 60ML SFT PLIABLE BLB EZ TO GRP 1 HND USE W/

## (undated) DEVICE — SYRINGE BLB 50CC IRRIG PLIABLE FNGR FLNG GRAD FLSK DISP

## (undated) DEVICE — (D)BUR DISECT MTCH HD 2.9X3.8M -- NLM 03272013

## (undated) DEVICE — KENDALL SCD EXPRESS SLEEVES, KNEE LENGTH, MEDIUM: Brand: KENDALL SCD

## (undated) DEVICE — SPONGE LAP 18X18IN STRL -- 5/PK

## (undated) DEVICE — MEDIUM GRAFT KIT

## (undated) DEVICE — THE MILL DISPOSABLE - FINE

## (undated) DEVICE — DRAPE C-ARMOUR C-ARM KIT --

## (undated) DEVICE — BLADE ELECTRODE: Brand: EDGE

## (undated) DEVICE — DRAPE,REIN 53X77,STERILE: Brand: MEDLINE

## (undated) DEVICE — FLOSEAL HEMOSTATIC MATRIX, 10 ML: Brand: FLOSEAL

## (undated) DEVICE — CODMAN® SURGICAL STRIPS 1/2" X 6" (13MM X 152MM): Brand: CODMAN®

## (undated) DEVICE — AGENT HEMSTAT 8ML FLX TIP MTRX + DISP SURGIFLO